# Patient Record
Sex: MALE | Race: WHITE | NOT HISPANIC OR LATINO
[De-identification: names, ages, dates, MRNs, and addresses within clinical notes are randomized per-mention and may not be internally consistent; named-entity substitution may affect disease eponyms.]

---

## 2017-05-01 ENCOUNTER — APPOINTMENT (OUTPATIENT)
Dept: UROLOGY | Facility: CLINIC | Age: 67
End: 2017-05-01

## 2017-05-01 VITALS
RESPIRATION RATE: 15 BRPM | DIASTOLIC BLOOD PRESSURE: 62 MMHG | HEART RATE: 67 BPM | BODY MASS INDEX: 26.52 KG/M2 | TEMPERATURE: 98 F | SYSTOLIC BLOOD PRESSURE: 167 MMHG | WEIGHT: 175 LBS | HEIGHT: 68 IN

## 2017-05-01 DIAGNOSIS — K60.2 ANAL FISSURE, UNSPECIFIED: ICD-10-CM

## 2017-05-01 DIAGNOSIS — Z86.39 PERSONAL HISTORY OF OTHER ENDOCRINE, NUTRITIONAL AND METABOLIC DISEASE: ICD-10-CM

## 2017-05-01 DIAGNOSIS — Z87.19 PERSONAL HISTORY OF OTHER DISEASES OF THE DIGESTIVE SYSTEM: ICD-10-CM

## 2017-05-01 DIAGNOSIS — C61 MALIGNANT NEOPLASM OF PROSTATE: ICD-10-CM

## 2017-05-01 DIAGNOSIS — Z78.9 OTHER SPECIFIED HEALTH STATUS: ICD-10-CM

## 2017-05-01 DIAGNOSIS — Z80.42 FAMILY HISTORY OF MALIGNANT NEOPLASM OF PROSTATE: ICD-10-CM

## 2017-05-01 DIAGNOSIS — Z86.79 PERSONAL HISTORY OF OTHER DISEASES OF THE CIRCULATORY SYSTEM: ICD-10-CM

## 2017-05-01 DIAGNOSIS — Z84.1 FAMILY HISTORY OF DISORDERS OF KIDNEY AND URETER: ICD-10-CM

## 2017-05-01 RX ORDER — ATORVASTATIN CALCIUM 20 MG/1
20 TABLET, FILM COATED ORAL
Refills: 0 | Status: ACTIVE | COMMUNITY

## 2017-05-01 RX ORDER — BIFIDOBACTERIUM LONGUM 10MM CELL
CAPSULE ORAL
Refills: 0 | Status: ACTIVE | COMMUNITY

## 2017-05-01 RX ORDER — CHROMIUM 200 MCG
TABLET ORAL
Refills: 0 | Status: ACTIVE | COMMUNITY

## 2017-05-01 RX ORDER — NEBIVOLOL HYDROCHLORIDE 5 MG/1
5 TABLET ORAL
Refills: 0 | Status: ACTIVE | COMMUNITY

## 2017-05-02 LAB
PSA FREE FLD-MCNC: 19 %
PSA FREE SERPL-MCNC: 0.44 NG/ML
PSA SERPL-MCNC: 2.29 NG/ML

## 2017-08-29 ENCOUNTER — APPOINTMENT (OUTPATIENT)
Dept: UROLOGY | Facility: CLINIC | Age: 67
End: 2017-08-29

## 2018-04-07 ENCOUNTER — TRANSCRIPTION ENCOUNTER (OUTPATIENT)
Age: 68
End: 2018-04-07

## 2018-12-05 ENCOUNTER — APPOINTMENT (OUTPATIENT)
Dept: INTERNAL MEDICINE | Facility: CLINIC | Age: 68
End: 2018-12-05
Payer: MEDICARE

## 2018-12-05 VITALS
HEIGHT: 68 IN | HEART RATE: 70 BPM | WEIGHT: 168 LBS | DIASTOLIC BLOOD PRESSURE: 75 MMHG | BODY MASS INDEX: 25.46 KG/M2 | SYSTOLIC BLOOD PRESSURE: 175 MMHG

## 2018-12-05 DIAGNOSIS — R68.89 OTHER GENERAL SYMPTOMS AND SIGNS: ICD-10-CM

## 2018-12-05 DIAGNOSIS — I10 ESSENTIAL (PRIMARY) HYPERTENSION: ICD-10-CM

## 2018-12-05 DIAGNOSIS — R10.13 DISEASE OF STOMACH AND DUODENUM, UNSPECIFIED: ICD-10-CM

## 2018-12-05 DIAGNOSIS — I25.10 ATHEROSCLEROTIC HEART DISEASE OF NATIVE CORONARY ARTERY W/OUT ANGINA PECTORIS: ICD-10-CM

## 2018-12-05 DIAGNOSIS — Z87.891 PERSONAL HISTORY OF NICOTINE DEPENDENCE: ICD-10-CM

## 2018-12-05 DIAGNOSIS — I70.90 UNSPECIFIED ATHEROSCLEROSIS: ICD-10-CM

## 2018-12-05 DIAGNOSIS — Z87.898 PERSONAL HISTORY OF OTHER SPECIFIED CONDITIONS: ICD-10-CM

## 2018-12-05 DIAGNOSIS — R07.9 CHEST PAIN, UNSPECIFIED: ICD-10-CM

## 2018-12-05 DIAGNOSIS — K31.9 DISEASE OF STOMACH AND DUODENUM, UNSPECIFIED: ICD-10-CM

## 2018-12-05 PROCEDURE — 99204 OFFICE O/P NEW MOD 45 MIN: CPT

## 2018-12-06 PROBLEM — I70.90 ATHEROSCLEROSIS: Status: ACTIVE | Noted: 2018-12-06

## 2018-12-06 PROBLEM — I25.10 CORONARY ARTERY ARTERIOSCLEROSIS: Status: ACTIVE | Noted: 2018-12-06

## 2018-12-06 PROBLEM — K31.9 DYSPEPSIA AND DISORDER OF FUNCTION OF STOMACH: Status: ACTIVE | Noted: 2018-12-06

## 2018-12-06 PROBLEM — R07.9 CHEST PAIN IN ADULT: Status: ACTIVE | Noted: 2018-12-06

## 2018-12-07 PROBLEM — R68.89 ABNORMAL DIGITAL RECTAL EXAM: Status: RESOLVED | Noted: 2017-05-01 | Resolved: 2018-12-07

## 2018-12-07 PROBLEM — Z87.891 FORMER SMOKER: Status: ACTIVE | Noted: 2017-05-01

## 2018-12-07 PROBLEM — Z87.898 HISTORY OF ELEVATED PROSTATE SPECIFIC ANTIGEN (PSA): Status: RESOLVED | Noted: 2017-05-01 | Resolved: 2018-12-07

## 2018-12-07 RX ORDER — DEXLANSOPRAZOLE 60 MG/1
60 CAPSULE, DELAYED RELEASE ORAL
Refills: 0 | Status: ACTIVE | COMMUNITY

## 2018-12-07 RX ORDER — TICAGRELOR 60 MG/1
60 TABLET ORAL TWICE DAILY
Refills: 0 | Status: ACTIVE | COMMUNITY

## 2018-12-07 RX ORDER — OLMESARTAN MEDOXOMIL-HYDROCHLOROTHIAZIDE 25; 40 MG/1; MG/1
40-25 TABLET, FILM COATED ORAL DAILY
Refills: 0 | Status: ACTIVE | COMMUNITY

## 2018-12-07 NOTE — PLAN
[FreeTextEntry1] : Next step:\par 1. monitor BP at home at different times of day and activity.\par 2. follow up with Cardiology soon.\par 3. Both of the above should be done independent of pursuing veganism

## 2018-12-07 NOTE — ASSESSMENT
[FreeTextEntry1] : Lengthy discussion with patient and his wife. Points can be summarized as:\par 1. There is nothing wrong with a vegan diet, whether it does or doesn't reverse his known extensive atherosclerosis. It also may or may not resolve dyspepsia. However, there is no harm in trying it\par 2. + Mult risk facotrs for atherosclerosis and a significant past hx as well. Cardiac f/u is urged. Anticipate they would do a stress test. He should worry about the results after it is done.\par 3. If there are no severe abnormalities on cardiac testing, he has the option of taking isosorbide, prn NTG, and monitoor while on his vegan diet. If there are severe changes, the timing of internvention would need to be determined by Cardiology.\par 4. ALready using a PPI and antireflux measures; any GI based sxs would be mostly functional dyspepsia.\par 5. Prostate Cancer is under control and not the cause of any sxs.

## 2018-12-07 NOTE — REVIEW OF SYSTEMS
[Patient Intake Form Reviewed] : Patient intake form was reviewed [FreeTextEntry3] : h/o retinal tear; wears glasses [FreeTextEntry8] : good urinary fx p prostatectomy [FreeTextEntry9] : sciatica

## 2018-12-07 NOTE — HISTORY OF PRESENT ILLNESS
[FreeTextEntry1] : Initial Internal Medicine evaluation at [facility] [de-identified] : 68-year-old patient troubled by occasional episodes of substernal chest pain.  The most recent episode occurred while walking, several hours after eating a vegan, bu spiced, dinner.  At other times, Cp has occurred with and without exertion, and exertion often occurs w/o pain. The problem never occurred while eating and there is never nausea, vomiting,dysphagia, or sudden severe pain radiating into the back There is past hx of dyspepsia; an endoscopy was negative for esopphagitis in 2016. 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 Much more dramatic is his hx of atherosclerosis. When GI-like sxs developd 1 year ago, the GI MD insisted on cardiac evaluation prior to an EGD. Of note, Dexilant was started and his sxs decreased but some episodes, llike the above still occurred. Cardiac evaluation revealed an abnl stress test, and on angiography 4 stents were placed in 3 vessel over 2 sessions.  He was treated for HTN, Hyperlipidemia, but has not followed up with cardiology, in part because of fear that they will want to perform further invasive and perhaps unneeded cardiac testing . One of the angiograms resulted in femoral artery injury and complications that required further treatment. In addition, there is a past hx of peripheral artery disease sxs (documentation unknown to me) and a marked family h/o a father who  at 39 from an acute cardiac event and a brother who had a CABG. He would like to follow a vegan diet in the hope of reversing atherosclerosis.

## 2018-12-07 NOTE — PHYSICAL EXAM
[No Acute Distress] : no acute distress [Well-Appearing] : well-appearing [Normal Sclera/Conjunctiva] : normal sclera/conjunctiva [No Respiratory Distress] : no respiratory distress  [Clear to Auscultation] : lungs were clear to auscultation bilaterally [Normal Rate] : normal rate  [Regular Rhythm] : with a regular rhythm [No Edema] : there was no peripheral edema

## 2019-01-30 ENCOUNTER — INPATIENT (INPATIENT)
Facility: HOSPITAL | Age: 69
LOS: 0 days | Discharge: HOME | DRG: 251 | End: 2019-01-31
Attending: INTERNAL MEDICINE | Admitting: INTERNAL MEDICINE
Payer: COMMERCIAL

## 2019-01-30 ENCOUNTER — OUTPATIENT (OUTPATIENT)
Dept: OUTPATIENT SERVICES | Facility: HOSPITAL | Age: 69
LOS: 1 days | End: 2019-01-30
Payer: MEDICARE

## 2019-01-30 VITALS
OXYGEN SATURATION: 97 % | RESPIRATION RATE: 16 BRPM | HEART RATE: 76 BPM | TEMPERATURE: 98 F | DIASTOLIC BLOOD PRESSURE: 72 MMHG | SYSTOLIC BLOOD PRESSURE: 191 MMHG

## 2019-01-30 DIAGNOSIS — I20.0 UNSTABLE ANGINA: ICD-10-CM

## 2019-01-30 DIAGNOSIS — E87.6 HYPOKALEMIA: ICD-10-CM

## 2019-01-30 DIAGNOSIS — I70.201 UNSPECIFIED ATHEROSCLEROSIS OF NATIVE ARTERIES OF EXTREMITIES, RIGHT LEG: Chronic | ICD-10-CM

## 2019-01-30 DIAGNOSIS — Z90.79 ACQUIRED ABSENCE OF OTHER GENITAL ORGAN(S): Chronic | ICD-10-CM

## 2019-01-30 DIAGNOSIS — E78.5 HYPERLIPIDEMIA, UNSPECIFIED: ICD-10-CM

## 2019-01-30 DIAGNOSIS — R07.9 CHEST PAIN, UNSPECIFIED: ICD-10-CM

## 2019-01-30 DIAGNOSIS — I25.9 CHRONIC ISCHEMIC HEART DISEASE, UNSPECIFIED: ICD-10-CM

## 2019-01-30 DIAGNOSIS — I10 ESSENTIAL (PRIMARY) HYPERTENSION: ICD-10-CM

## 2019-01-30 LAB
ALBUMIN SERPL ELPH-MCNC: 4.2 G/DL — SIGNIFICANT CHANGE UP (ref 3.3–5)
ALP SERPL-CCNC: 79 U/L — SIGNIFICANT CHANGE UP (ref 40–120)
ALT FLD-CCNC: 11 U/L — SIGNIFICANT CHANGE UP (ref 10–45)
ANION GAP SERPL CALC-SCNC: 12 MMOL/L — SIGNIFICANT CHANGE UP (ref 5–17)
APTT BLD: 31.2 SEC — SIGNIFICANT CHANGE UP (ref 27.5–36.3)
AST SERPL-CCNC: 13 U/L — SIGNIFICANT CHANGE UP (ref 10–40)
BASOPHILS NFR BLD AUTO: 0.5 % — SIGNIFICANT CHANGE UP (ref 0–2)
BILIRUB SERPL-MCNC: 0.4 MG/DL — SIGNIFICANT CHANGE UP (ref 0.2–1.2)
BUN SERPL-MCNC: 13 MG/DL — SIGNIFICANT CHANGE UP (ref 7–23)
CALCIUM SERPL-MCNC: 9.1 MG/DL — SIGNIFICANT CHANGE UP (ref 8.4–10.5)
CHLORIDE SERPL-SCNC: 102 MMOL/L — SIGNIFICANT CHANGE UP (ref 96–108)
CHOLEST SERPL-MCNC: 101 MG/DL — SIGNIFICANT CHANGE UP (ref 10–199)
CK MB CFR SERPL CALC: 1.2 NG/ML — SIGNIFICANT CHANGE UP (ref 0–6.7)
CK MB CFR SERPL CALC: 1.4 NG/ML — SIGNIFICANT CHANGE UP (ref 0–6.7)
CK SERPL-CCNC: 70 U/L — SIGNIFICANT CHANGE UP (ref 30–200)
CO2 SERPL-SCNC: 26 MMOL/L — SIGNIFICANT CHANGE UP (ref 22–31)
CREAT SERPL-MCNC: 0.89 MG/DL — SIGNIFICANT CHANGE UP (ref 0.5–1.3)
EOSINOPHIL NFR BLD AUTO: 0.8 % — SIGNIFICANT CHANGE UP (ref 0–6)
GLUCOSE SERPL-MCNC: 118 MG/DL — HIGH (ref 70–99)
HBA1C BLD-MCNC: 5.5 % — SIGNIFICANT CHANGE UP (ref 4–5.6)
HCT VFR BLD CALC: 37.1 % — LOW (ref 39–50)
HDLC SERPL-MCNC: 42 MG/DL — SIGNIFICANT CHANGE UP
HGB BLD-MCNC: 12.8 G/DL — LOW (ref 13–17)
INR BLD: 1.2 — HIGH (ref 0.88–1.16)
LIPID PNL WITH DIRECT LDL SERPL: 36 MG/DL — SIGNIFICANT CHANGE UP
LYMPHOCYTES # BLD AUTO: 12.1 % — LOW (ref 13–44)
MAGNESIUM SERPL-MCNC: 2 MG/DL — SIGNIFICANT CHANGE UP (ref 1.6–2.6)
MCHC RBC-ENTMCNC: 29.7 PG — SIGNIFICANT CHANGE UP (ref 27–34)
MCHC RBC-ENTMCNC: 34.5 G/DL — SIGNIFICANT CHANGE UP (ref 32–36)
MCV RBC AUTO: 86.1 FL — SIGNIFICANT CHANGE UP (ref 80–100)
MONOCYTES NFR BLD AUTO: 11.9 % — SIGNIFICANT CHANGE UP (ref 2–14)
NEUTROPHILS NFR BLD AUTO: 74.7 % — SIGNIFICANT CHANGE UP (ref 43–77)
PLATELET # BLD AUTO: 277 K/UL — SIGNIFICANT CHANGE UP (ref 150–400)
POTASSIUM SERPL-MCNC: 3.3 MMOL/L — LOW (ref 3.5–5.3)
POTASSIUM SERPL-SCNC: 3.3 MMOL/L — LOW (ref 3.5–5.3)
PROT SERPL-MCNC: 7 G/DL — SIGNIFICANT CHANGE UP (ref 6–8.3)
PROTHROM AB SERPL-ACNC: 13.6 SEC — HIGH (ref 10–12.9)
RBC # BLD: 4.31 M/UL — SIGNIFICANT CHANGE UP (ref 4.2–5.8)
RBC # FLD: 12.5 % — SIGNIFICANT CHANGE UP (ref 10.3–16.9)
SODIUM SERPL-SCNC: 140 MMOL/L — SIGNIFICANT CHANGE UP (ref 135–145)
TOTAL CHOLESTEROL/HDL RATIO MEASUREMENT: 2.4 RATIO — LOW (ref 3.4–9.6)
TRIGL SERPL-MCNC: 113 MG/DL — SIGNIFICANT CHANGE UP (ref 10–149)
TROPONIN T SERPL-MCNC: <0.01 NG/ML — SIGNIFICANT CHANGE UP (ref 0–0.01)
TROPONIN T SERPL-MCNC: <0.01 NG/ML — SIGNIFICANT CHANGE UP (ref 0–0.01)
TSH SERPL-MCNC: 0.89 UIU/ML — SIGNIFICANT CHANGE UP (ref 0.35–4.94)
WBC # BLD: 8.6 K/UL — SIGNIFICANT CHANGE UP (ref 3.8–10.5)
WBC # FLD AUTO: 8.6 K/UL — SIGNIFICANT CHANGE UP (ref 3.8–10.5)

## 2019-01-30 PROCEDURE — 99285 EMERGENCY DEPT VISIT HI MDM: CPT

## 2019-01-30 PROCEDURE — 99222 1ST HOSP IP/OBS MODERATE 55: CPT | Mod: AI

## 2019-01-30 PROCEDURE — 93018 CV STRESS TEST I&R ONLY: CPT

## 2019-01-30 PROCEDURE — 93016 CV STRESS TEST SUPVJ ONLY: CPT

## 2019-01-30 PROCEDURE — 78452 HT MUSCLE IMAGE SPECT MULT: CPT | Mod: 26

## 2019-01-30 RX ORDER — LOSARTAN POTASSIUM 100 MG/1
100 TABLET, FILM COATED ORAL DAILY
Qty: 0 | Refills: 0 | Status: DISCONTINUED | OUTPATIENT
Start: 2019-01-30 | End: 2019-01-31

## 2019-01-30 RX ORDER — ATORVASTATIN CALCIUM 80 MG/1
80 TABLET, FILM COATED ORAL AT BEDTIME
Qty: 0 | Refills: 0 | Status: DISCONTINUED | OUTPATIENT
Start: 2019-01-30 | End: 2019-01-31

## 2019-01-30 RX ORDER — ASPIRIN/CALCIUM CARB/MAGNESIUM 324 MG
81 TABLET ORAL DAILY
Qty: 0 | Refills: 0 | Status: DISCONTINUED | OUTPATIENT
Start: 2019-01-30 | End: 2019-01-31

## 2019-01-30 RX ORDER — PANTOPRAZOLE SODIUM 20 MG/1
40 TABLET, DELAYED RELEASE ORAL AT BEDTIME
Qty: 0 | Refills: 0 | Status: DISCONTINUED | OUTPATIENT
Start: 2019-01-30 | End: 2019-01-30

## 2019-01-30 RX ORDER — NEBIVOLOL HYDROCHLORIDE 5 MG/1
5 TABLET ORAL
Qty: 0 | Refills: 0 | Status: DISCONTINUED | OUTPATIENT
Start: 2019-01-30 | End: 2019-01-31

## 2019-01-30 RX ORDER — NEBIVOLOL HYDROCHLORIDE 5 MG/1
5 TABLET ORAL DAILY
Qty: 0 | Refills: 0 | Status: DISCONTINUED | OUTPATIENT
Start: 2019-01-30 | End: 2019-01-30

## 2019-01-30 RX ORDER — HYDROCHLOROTHIAZIDE 25 MG
12.5 TABLET ORAL DAILY
Qty: 0 | Refills: 0 | Status: DISCONTINUED | OUTPATIENT
Start: 2019-01-30 | End: 2019-01-31

## 2019-01-30 RX ORDER — LANOLIN ALCOHOL/MO/W.PET/CERES
3 CREAM (GRAM) TOPICAL AT BEDTIME
Qty: 0 | Refills: 0 | Status: DISCONTINUED | OUTPATIENT
Start: 2019-01-30 | End: 2019-01-31

## 2019-01-30 RX ORDER — PANTOPRAZOLE SODIUM 20 MG/1
40 TABLET, DELAYED RELEASE ORAL
Qty: 0 | Refills: 0 | Status: DISCONTINUED | OUTPATIENT
Start: 2019-01-30 | End: 2019-01-31

## 2019-01-30 RX ORDER — POTASSIUM CHLORIDE 20 MEQ
60 PACKET (EA) ORAL ONCE
Qty: 0 | Refills: 0 | Status: COMPLETED | OUTPATIENT
Start: 2019-01-30 | End: 2019-01-30

## 2019-01-30 RX ORDER — TICAGRELOR 90 MG/1
90 TABLET ORAL
Qty: 0 | Refills: 0 | Status: DISCONTINUED | OUTPATIENT
Start: 2019-01-30 | End: 2019-01-31

## 2019-01-30 RX ADMIN — NEBIVOLOL HYDROCHLORIDE 5 MILLIGRAM(S): 5 TABLET ORAL at 22:29

## 2019-01-30 RX ADMIN — ATORVASTATIN CALCIUM 80 MILLIGRAM(S): 80 TABLET, FILM COATED ORAL at 22:10

## 2019-01-30 RX ADMIN — TICAGRELOR 90 MILLIGRAM(S): 90 TABLET ORAL at 22:09

## 2019-01-30 RX ADMIN — PANTOPRAZOLE SODIUM 40 MILLIGRAM(S): 20 TABLET, DELAYED RELEASE ORAL at 22:29

## 2019-01-30 RX ADMIN — Medication 60 MILLIEQUIVALENT(S): at 22:09

## 2019-01-30 NOTE — H&P ADULT - ASSESSMENT
69 yo male, former smoker, FHX CAD (brother has 4VCABG), PMHx HTN, Hyperlipidemia, GERD, h/o prostate CA s/p radical prostatectomy (2016), CAD s/p HOLLY x 2 LAD, LCX, RCA in 2018 at [facility] complicated by right femoral artery damage tx with femoral endarterectomy presented to cardiologist, Dr Joey Tellez, endorsing intermittent chest pressure with minimal exertion, worse when it is cold outside, worsening over the last 2 months. Denies SOB, dizziness, palpitations, nausea, vomiting, PND/orthopnea, LE edema, claudication. Pt had NST in Dr Tellez's office on 1/30/19 which was concerning for LAD disease and sent the pt to [facility] ED. In [facility] ED pt asymptomatic at rest. VSS. EKG NSR at 75 bpm with TWI AVL. Trop neg x 1. Pt admitted to [facility] further management of unstable angina. Pt precathed and consented for cardiac cath with Dr Deleon on 1/31/19 AM. 67 yo male, former smoker, FHX CAD (brother has 4VCABG), PMHx HTN, Hyperlipidemia, GERD, h/o prostate CA s/p radical prostatectomy (2016), CAD s/p HOLLY x 2 LAD, LCX, RCA in 2018 at [facility] complicated by right femoral artery damage tx with femoral endarterectomy presented to cardiologist, Dr Joey Tellez, endorsing intermittent chest pressure with minimal exertion, worse when it is cold outside, worsening over the last 2 months. Denies SOB, dizziness, palpitations, nausea, vomiting, PND/orthopnea, LE edema, claudication. NST on 1/30/19 in Dr Tellez's office showed  2-3mm horizontal ST depressions II, II, AVF and V4-V6 with substernal chest pain (5/10 in intensity). Also pt had medium-sized, moderate intensity, reversible perfusion defect in anterior wall from base to apex in distribution of prox LAD. In [facility] ED pt asymptomatic at rest. VSS. EKG NSR at 75 bpm with TWI AVL. Trop neg x 1. Pt admitted to [facility] further management of unstable angina. Pt precathed and consented for cardiac cath with Dr Deleon on 1/31/19 AM.

## 2019-01-30 NOTE — H&P ADULT - PROBLEM SELECTOR PLAN 1
- currently asymptomatic  - EKG NSR 75bpm with no TWI AVL. Trop neg x 1, f/u 10pm and AM   - NST 1/30/19  2-3mm horizontal ST depressions II, II, AVF and V4-V6 with substernal chest pain (5/10 in intensity). Also pt had medium-sized, moderate intensity, reversible perfusion defect in anterior wall from base to apex in distribution of prox LAD.   - Pt was increased from Brilinta 60mg BID to Brilinta 90mg BID. Continue Aspirin 81mg daily. Pt was recently increased from Atorvastatin 20mg daily to Rosuvastatin 20mg daily one week ago by Dr Tellez. Continue Atorvastatin 80mg daily at bedtime while inpatient.   - Pt precathed/consented for cardiac cath on 1/31/19 with Dr Deleon.  - Echo ordered.  - NPO after midnight for cath in AM

## 2019-01-30 NOTE — ED ADULT TRIAGE NOTE - OTHER COMPLAINTS
pt admits to having cp during stress test today. denies cp or sob at this time. hx stentx4. ekg in progress.

## 2019-01-30 NOTE — H&P ADULT - PROBLEM SELECTOR PLAN 2
- Continuing Bystolic 5mg daily, Hydrochlorothiazide 12.5mg daily, and switching home Olmesartan 40mg daily to Losartan 100mg daily inpatient.

## 2019-01-30 NOTE — ED ADULT NURSE NOTE - OBJECTIVE STATEMENT
Pt presents with CP during stress test today, resolved with rest. Pt states he has been having CP intermittently x few months whenever he exercises. Pt denies any SOB, palpitations, dizziness. Pt reports history of 4 stents.

## 2019-01-30 NOTE — H&P ADULT - ATTENDING COMMENTS
Assessment: Patient personally seen and examined myself during rounds with the Physician Assistant/House Staff/Nurse Practitioner   ON DATE 1/30/19  Physician Assistant/House Staff/Nurse Practitioner note read, including vitals, physical findings, laboratory data, and radiological reports.   Revisions included below.   Direct personal management at bed side and extensive interpretation of the data.    Plan was outlined and discussed in details with the Physician Assistant/House Staff/Nurse Practitioner.    Decision making of high complexity   Risk high of complications, morbidity, and/or mortality  Assessment and Action taken for acute disease activity to reflect the level of care provided:  -Hemodynamic evaluation and support  -ACS assessment and treatment as applicable  -Heart failure assessment and treatment as applicable  -Cardiac Telemetry reviewed  -Medication reconciliation  -Review laboratory data  -EKG reviewed - no stemi  -Echo ordered  -Interdisciplinary discussion with IC / EP / HF / CTS teams as needed  TIME SPENT in evaluation and management, reassessments, review and interpretation of labs and x-rays, and hemodynamic management, formulating a plan and coordinating care: ___70____ MIN.  Time does not include procedural time.    Jose Varela MD  Cardiology    Mobile: [phone]  Office: [phone]  [address] [place], [place] [address]

## 2019-01-30 NOTE — ED ADULT NURSE NOTE - NSIMPLEMENTINTERV_GEN_ALL_ED
Implemented All Universal Safety Interventions:  [place] to call system. Call bell, personal items and telephone within reach. Instruct patient to call for assistance. Room bathroom lighting operational. Non-slip footwear when patient is off stretcher. Physically safe environment: no spills, clutter or unnecessary equipment. Stretcher in lowest position, wheels locked, appropriate side rails in place.

## 2019-01-30 NOTE — H&P ADULT - HISTORY OF PRESENT ILLNESS
67 yo male, former smoker, FHX CAD (brother has 4VCABG), PMHx HTN, Hyperlipidemia, GERD, h/o prostate CA s/p radical prostatectomy (2016), CAD s/p HOLLY x 2 LAD, LCX, RCA in 2018 at [facility] complicated by right femoral artery damage tx with femoral endarterectomy presented to cardiologist, Dr Joey Tellez, endorsing intermittent chest pressure with minimal exertion, worse when it is cold outside, worsening over the last 2 months. Denies SOB, dizziness, palpitations, nausea, vomiting, PND/orthopnea, LE edema, claudication. Pt had NST in Dr Tellez's office on 1/30/19 which was concerning for LAD disease and sent the pt to [facility] ED. In [facility] ED pt asymptomatic at rest. VSS. EKG NSR at 75 bpm with TWI AVL. Trop neg x 1. Pt admitted to [facility] further management of unstable angina. Pt precathed and consented for cardiac cath with Dr Deleon on 1/31/19 AM. 67 yo male, former smoker, FHX CAD (brother has 4VCABG), PMHx HTN, Hyperlipidemia, GERD, h/o prostate CA s/p radical prostatectomy (2016), CAD s/p HOLLY x 2 LAD, LCX, RCA in 2018 at [facility] complicated by right femoral artery damage tx with femoral endarterectomy presented to cardiologist, Dr Joey Tellez, endorsing intermittent chest pressure with minimal exertion, worse when it is cold outside, worsening over the last 2 months. Denies SOB, dizziness, palpitations, nausea, vomiting, PND/orthopnea, LE edema, claudication. NST on 1/30/19 in Dr Tellez's office showed  2-3mm horizontal ST depressions II, II, AVF and V4-V6 with substernal chest pain (5/10 in intensity). Also pt had medium-sized, moderate intensity, reversible perfusion defect in anterior wall from base to apex in distribution of prox LAD. In [facility] ED pt asymptomatic at rest. VSS. EKG NSR at 75 bpm with TWI AVL. Trop neg x 1. Pt admitted to [facility] further management of unstable angina. Pt precathed and consented for cardiac cath with Dr Deleon on 1/31/19 AM.

## 2019-01-30 NOTE — ED ADULT NURSE NOTE - CHPI ED NUR SYMPTOMS NEG
no back pain/no congestion/no dizziness/no diaphoresis/no nausea/no syncope/no fever/no chills/no shortness of breath/no vomiting

## 2019-01-30 NOTE — H&P ADULT - NEGATIVE CARDIOVASCULAR SYMPTOMS
no orthopnea/no paroxysmal nocturnal dyspnea/no palpitations/no peripheral edema/no claudication/no dyspnea on exertion

## 2019-01-30 NOTE — ED PROVIDER NOTE - OBJECTIVE STATEMENT
67 y/o m with PMH of CAD s/p 4 stents one year prior, GERD, HLD, radical prostatectomy 2017 presents to ED with cards fellow for abnormal outpt stress test showing possible LAD abnormality.  Pt sent for stress test after seeing Dr. Castillo for first time one week ago.  Pt states he intermittently has chest pain from time to time but is able to exercise and usually has no symptoms.  No current chest pain or shortness of breath in ED.  Feels well without complaints.

## 2019-01-30 NOTE — H&P ADULT - PROBLEM SELECTOR PLAN 3
Pt was recently increased from Atorvastatin 20mg daily to Rosuvastatin 20mg daily one week ago by Dr Tellez. Continue Atorvastatin 80mg daily at bedtime while inpatient.

## 2019-01-30 NOTE — ED PROVIDER NOTE - MEDICAL DECISION MAKING DETAILS
Pt with abnormal stress test sent to ED for admission for cath tomorrow.  Cp free in ED.  No ischemic changes on EKG.  Labs done in ED.  Will admit to tele for further management.  Discussed with cards fellow who is aware of admission to service attending Dr. Varela.

## 2019-01-31 ENCOUNTER — TRANSCRIPTION ENCOUNTER (OUTPATIENT)
Age: 69
End: 2019-01-31

## 2019-01-31 VITALS
OXYGEN SATURATION: 99 % | SYSTOLIC BLOOD PRESSURE: 133 MMHG | DIASTOLIC BLOOD PRESSURE: 67 MMHG | RESPIRATION RATE: 16 BRPM | HEART RATE: 58 BPM

## 2019-01-31 LAB
ALBUMIN SERPL ELPH-MCNC: 4.2 G/DL — SIGNIFICANT CHANGE UP (ref 3.3–5)
ALP SERPL-CCNC: 82 U/L — SIGNIFICANT CHANGE UP (ref 40–120)
ALT FLD-CCNC: 11 U/L — SIGNIFICANT CHANGE UP (ref 10–45)
ANION GAP SERPL CALC-SCNC: 13 MMOL/L — SIGNIFICANT CHANGE UP (ref 5–17)
ANION GAP SERPL CALC-SCNC: 8 MMOL/L — SIGNIFICANT CHANGE UP (ref 5–17)
APTT BLD: 32 SEC — SIGNIFICANT CHANGE UP (ref 27.5–36.3)
AST SERPL-CCNC: 13 U/L — SIGNIFICANT CHANGE UP (ref 10–40)
BILIRUB SERPL-MCNC: 0.5 MG/DL — SIGNIFICANT CHANGE UP (ref 0.2–1.2)
BUN SERPL-MCNC: 14 MG/DL — SIGNIFICANT CHANGE UP (ref 7–23)
BUN SERPL-MCNC: 16 MG/DL — SIGNIFICANT CHANGE UP (ref 7–23)
CALCIUM SERPL-MCNC: 9.5 MG/DL — SIGNIFICANT CHANGE UP (ref 8.4–10.5)
CALCIUM SERPL-MCNC: 9.5 MG/DL — SIGNIFICANT CHANGE UP (ref 8.4–10.5)
CHLORIDE SERPL-SCNC: 106 MMOL/L — SIGNIFICANT CHANGE UP (ref 96–108)
CHLORIDE SERPL-SCNC: 110 MMOL/L — HIGH (ref 96–108)
CO2 SERPL-SCNC: 27 MMOL/L — SIGNIFICANT CHANGE UP (ref 22–31)
CO2 SERPL-SCNC: 27 MMOL/L — SIGNIFICANT CHANGE UP (ref 22–31)
CREAT SERPL-MCNC: 0.91 MG/DL — SIGNIFICANT CHANGE UP (ref 0.5–1.3)
CREAT SERPL-MCNC: 0.93 MG/DL — SIGNIFICANT CHANGE UP (ref 0.5–1.3)
GLUCOSE SERPL-MCNC: 106 MG/DL — HIGH (ref 70–99)
GLUCOSE SERPL-MCNC: 115 MG/DL — HIGH (ref 70–99)
HCT VFR BLD CALC: 37.8 % — LOW (ref 39–50)
HCT VFR BLD CALC: 40.2 % — SIGNIFICANT CHANGE UP (ref 39–50)
HGB BLD-MCNC: 12.7 G/DL — LOW (ref 13–17)
HGB BLD-MCNC: 13.6 G/DL — SIGNIFICANT CHANGE UP (ref 13–17)
INR BLD: 1.14 — SIGNIFICANT CHANGE UP (ref 0.88–1.16)
MAGNESIUM SERPL-MCNC: 2.2 MG/DL — SIGNIFICANT CHANGE UP (ref 1.6–2.6)
MAGNESIUM SERPL-MCNC: 2.3 MG/DL — SIGNIFICANT CHANGE UP (ref 1.6–2.6)
MCHC RBC-ENTMCNC: 29.5 PG — SIGNIFICANT CHANGE UP (ref 27–34)
MCHC RBC-ENTMCNC: 29.5 PG — SIGNIFICANT CHANGE UP (ref 27–34)
MCHC RBC-ENTMCNC: 33.6 G/DL — SIGNIFICANT CHANGE UP (ref 32–36)
MCHC RBC-ENTMCNC: 33.8 G/DL — SIGNIFICANT CHANGE UP (ref 32–36)
MCV RBC AUTO: 87.2 FL — SIGNIFICANT CHANGE UP (ref 80–100)
MCV RBC AUTO: 87.7 FL — SIGNIFICANT CHANGE UP (ref 80–100)
PLATELET # BLD AUTO: 275 K/UL — SIGNIFICANT CHANGE UP (ref 150–400)
PLATELET # BLD AUTO: 303 K/UL — SIGNIFICANT CHANGE UP (ref 150–400)
POTASSIUM SERPL-MCNC: 3.7 MMOL/L — SIGNIFICANT CHANGE UP (ref 3.5–5.3)
POTASSIUM SERPL-MCNC: 4.3 MMOL/L — SIGNIFICANT CHANGE UP (ref 3.5–5.3)
POTASSIUM SERPL-SCNC: 3.7 MMOL/L — SIGNIFICANT CHANGE UP (ref 3.5–5.3)
POTASSIUM SERPL-SCNC: 4.3 MMOL/L — SIGNIFICANT CHANGE UP (ref 3.5–5.3)
PROT SERPL-MCNC: 6.5 G/DL — SIGNIFICANT CHANGE UP (ref 6–8.3)
PROTHROM AB SERPL-ACNC: 12.9 SEC — SIGNIFICANT CHANGE UP (ref 10–12.9)
RBC # BLD: 4.31 M/UL — SIGNIFICANT CHANGE UP (ref 4.2–5.8)
RBC # BLD: 4.61 M/UL — SIGNIFICANT CHANGE UP (ref 4.2–5.8)
RBC # FLD: 12.8 % — SIGNIFICANT CHANGE UP (ref 10.3–16.9)
RBC # FLD: 12.9 % — SIGNIFICANT CHANGE UP (ref 10.3–16.9)
SODIUM SERPL-SCNC: 145 MMOL/L — SIGNIFICANT CHANGE UP (ref 135–145)
SODIUM SERPL-SCNC: 146 MMOL/L — HIGH (ref 135–145)
WBC # BLD: 7.4 K/UL — SIGNIFICANT CHANGE UP (ref 3.8–10.5)
WBC # BLD: 7.9 K/UL — SIGNIFICANT CHANGE UP (ref 3.8–10.5)
WBC # FLD AUTO: 7.4 K/UL — SIGNIFICANT CHANGE UP (ref 3.8–10.5)
WBC # FLD AUTO: 7.9 K/UL — SIGNIFICANT CHANGE UP (ref 3.8–10.5)

## 2019-01-31 PROCEDURE — 92920 PRQ TRLUML C ANGIOP 1ART&/BR: CPT | Mod: LD

## 2019-01-31 PROCEDURE — 99238 HOSP IP/OBS DSCHRG MGMT 30/<: CPT

## 2019-01-31 PROCEDURE — 93306 TTE W/DOPPLER COMPLETE: CPT | Mod: 26

## 2019-01-31 PROCEDURE — 93458 L HRT ARTERY/VENTRICLE ANGIO: CPT | Mod: 26,XU

## 2019-01-31 RX ORDER — TICAGRELOR 90 MG/1
1 TABLET ORAL
Qty: 180 | Refills: 3
Start: 2019-01-31 | End: 2020-01-25

## 2019-01-31 RX ORDER — SODIUM CHLORIDE 9 MG/ML
1000 INJECTION INTRAMUSCULAR; INTRAVENOUS; SUBCUTANEOUS
Qty: 0 | Refills: 0 | Status: DISCONTINUED | OUTPATIENT
Start: 2019-01-31 | End: 2019-01-31

## 2019-01-31 RX ORDER — SODIUM CHLORIDE 9 MG/ML
500 INJECTION INTRAMUSCULAR; INTRAVENOUS; SUBCUTANEOUS
Qty: 0 | Refills: 0 | Status: DISCONTINUED | OUTPATIENT
Start: 2019-01-31 | End: 2019-01-31

## 2019-01-31 RX ORDER — TICAGRELOR 90 MG/1
1 TABLET ORAL
Qty: 0 | Refills: 0 | COMMUNITY

## 2019-01-31 RX ADMIN — Medication 12.5 MILLIGRAM(S): at 06:39

## 2019-01-31 RX ADMIN — LOSARTAN POTASSIUM 100 MILLIGRAM(S): 100 TABLET, FILM COATED ORAL at 06:38

## 2019-01-31 RX ADMIN — TICAGRELOR 90 MILLIGRAM(S): 90 TABLET ORAL at 06:39

## 2019-01-31 RX ADMIN — Medication 81 MILLIGRAM(S): at 12:46

## 2019-01-31 NOTE — DISCHARGE NOTE ADULT - CARE PROVIDER_API CALL
Joey Tellez (MD)  Cardiovascular Medicine  [address], [address]  [place] [address]  Phone: [phone]  Fax: [phone]

## 2019-01-31 NOTE — DISCHARGE NOTE ADULT - MEDICATION SUMMARY - MEDICATIONS TO STOP TAKING
I will STOP taking the medications listed below when I get home from the hospital:    Brilinta (ticagrelor) 60 mg oral tablet  -- 1 tab(s) by mouth 2 times a day

## 2019-01-31 NOTE — DISCHARGE NOTE ADULT - CARE PLAN
Principal Discharge DX:	CAD (coronary artery disease)  Goal:	Please continue aspirin 81 mg daily and Brilinta 90 mg twice daily  Assessment and plan of treatment:	You underwent a cardiac catheterization and received a balloon to open up your previously placed stent in the left anterior descending artery. The procedure was done through the right wrist.  You can removed the bandage in 24 hours and then You do not need to keep this area covered.  You may shower after removing the bandage, no soaking in a bathtub, swimming or hot tub for 5 days.  Please avoid any heavy lifting  (no more than 3 to 5 lbs) or strenuous activity for five days.  If you develop any swelling, bleeding, hardening of the skin (hematoma formation), acute pain, numbness/tingling  in your arm please contact your doctor immediately or call our 24/7 line: [phone]    NEVER MISS A DOSE OF ASPIRIN OR BRILINTA IF YOU DO, YOU ARE AT RISK OF YOUR STENT CLOSING AND HAVING A HEART ATTACK. DO NOT STOP THESE TWO MEDICATIONS UNLESS INSTRUCTED TO DO SO BY YOUR CARDIOLOGIST    Please follow up with Dr. Tellez on Wednesday 2/6/19  Secondary Diagnosis:	Hyperlipidemia  Goal:	Please continue crestor 20 mg daily  Secondary Diagnosis:	Hypertension  Goal:	Please continue bystolic 5 mg daily and benicar 40-12.5 mg daily

## 2019-01-31 NOTE — DISCHARGE NOTE ADULT - HOSPITAL COURSE
67 yo male, former smoker, FHX CAD (brother has 4VCABG), PMHx HTN, Hyperlipidemia, GERD, h/o prostate CA s/p radical prostatectomy (2016), CAD s/p HOLLY x 2 LAD, LCX, RCA in 2018 at [facility] complicated by right femoral artery damage tx with femoral endarterectomy presented to cardiologist, Dr Joey Tellez, endorsing intermittent chest pressure with minimal exertion, worse when it is cold outside, worsening over the last 2 months. Denies SOB, dizziness, palpitations, nausea, vomiting, PND/orthopnea, LE edema, claudication. NST on 1/30/19 in Dr Tellez's office showed  2-3mm horizontal ST depressions II, II, AVF and V4-V6 with substernal chest pain (5/10 in intensity). Also pt had medium-sized, moderate intensity, reversible perfusion defect in anterior wall from base to apex in distribution of prox LAD. In [facility] ED pt asymptomatic at rest. VSS. EKG NSR at 75 bpm with TWI AVL. Trop neg x 1. Pt admitted to [facility] further management of unstable angina. Pt precathed and consented for cardiac cath with Dr Deleon on 1/31/19 AM. Pt s/p cardiac cath showing Single vessel CAD (90% ISR mLAD), s/p angiosculpt and PTCA of mLAD, Patent stents in LAD, LCX & RCA,  EF 65%, LVEDP 21. R radial access site. Pt will continue with asa 81 mg daily, brilinta 90 mg BID, crestor 20 mg, bystolic 5 mg, and benicar 40-12.5mg. Radial site stable, no hematoma, bleeding, distal pulse intact. ECHO prior to discharge showing....   As discussed with Dr. Deleon and Dr. Varela pt is stable for discharge post cath after 3PM and will follow up with Dr. Rankin on Wed 2/6/19. 69 yo male, former smoker, FHX CAD (brother has 4VCABG), PMHx HTN, Hyperlipidemia, GERD, h/o prostate CA s/p radical prostatectomy (2016), CAD s/p HOLLY x 2 LAD, LCX, RCA in 2018 at [facility] complicated by right femoral artery damage tx with femoral endarterectomy presented to cardiologist, Dr Joey Tellez, endorsing intermittent chest pressure with minimal exertion, worse when it is cold outside, worsening over the last 2 months. Denies SOB, dizziness, palpitations, nausea, vomiting, PND/orthopnea, LE edema, claudication. NST on 1/30/19 in Dr Tellez's office showed  2-3mm horizontal ST depressions II, II, AVF and V4-V6 with substernal chest pain (5/10 in intensity). Also pt had medium-sized, moderate intensity, reversible perfusion defect in anterior wall from base to apex in distribution of prox LAD. In [facility] ED pt asymptomatic at rest. VSS. EKG NSR at 75 bpm with TWI AVL. Trop neg x 1. Pt admitted to [facility] further management of unstable angina. Pt precathed and consented for cardiac cath with Dr Deleon on 1/31/19 AM. Pt s/p cardiac cath showing Single vessel CAD (90% ISR mLAD), s/p angiosculpt and PTCA of mLAD, Patent stents in LAD, LCX & RCA,  EF 65%, LVEDP 21. R radial access site. Pt will continue with asa 81 mg daily, brilinta 90 mg BID, crestor 20 mg, bystolic 5 mg, and benicar 40-12.5mg. Radial site stable, no hematoma, bleeding, distal pulse intact. ECHO prior to discharge showing mild LVH, normal wall motion, EF 62%, no significant valvular disease, no pericardial effusion. Post cath labs and EKG stable prior to discharge.  As discussed with Dr. Deleon and Dr. Varela pt is stable for discharge post cath after 3PM and will follow up with Dr. Rankin on Wed 2/6/19.

## 2019-01-31 NOTE — DISCHARGE NOTE ADULT - PATIENT PORTAL LINK FT
You can access the [facility] Patient Portal, offered by [facility], by registering with the following website: [email or web address]

## 2019-01-31 NOTE — PROGRESS NOTE ADULT - SUBJECTIVE AND OBJECTIVE BOX
Procedure: [facility], PTCA of mLAD, Radial band  Indication: UA, CAD  Complication: none    Result:  1) Single vessel CAD (90% ISR mLAD)  2) Patent stents in LAD, LCX & RCA  3) Normal LV function, EF 65%, LVEDP 21  4) Successful Angiosculpt PTCA of mLAD      Plan: Cont. Brilinta 90mg (not 60mg) BID + ASA 81mg QD x 12 months. D/C home this afternoon @3PM - to f/u with Dr. Tellez.

## 2019-01-31 NOTE — DISCHARGE NOTE ADULT - MEDICATION SUMMARY - MEDICATIONS TO TAKE
I will START or STAY ON the medications listed below when I get home from the hospital:    aspirin 81 mg oral tablet, chewable  -- 1 tab(s) by mouth once a day  -- Indication: For CAD (coronary artery disease), keeps stents open    Crestor 20 mg oral tablet  -- 1 tab(s) by mouth once a day (at bedtime)  -- Indication: For Cholesterol    Benicar HCT 40 mg-12.5 mg oral tablet  -- 1 tab(s) by mouth once a day  -- Indication: For blood pressure    ticagrelor 90 mg oral tablet  -- 1 tab(s) by mouth 2 times a day  -- Indication: For CAD (coronary artery disease), keeps stents open     Bystolic 5 mg oral tablet  -- 1 tab(s) by mouth once a day  -- Indication: For blood pressure     Dexilant 60 mg oral delayed release capsule  -- 1 cap(s) by mouth once a day  -- Indication: For Acid reflux

## 2019-01-31 NOTE — DISCHARGE NOTE ADULT - PLAN OF CARE
Please continue aspirin 81 mg daily and Brilinta 90 mg twice daily You underwent a cardiac catheterization and received a balloon to open up your previously placed stent in the left anterior descending artery. The procedure was done through the right wrist.  You can removed the bandage in 24 hours and then You do not need to keep this area covered.  You may shower after removing the bandage, no soaking in a bathtub, swimming or hot tub for 5 days.  Please avoid any heavy lifting  (no more than 3 to 5 lbs) or strenuous activity for five days.  If you develop any swelling, bleeding, hardening of the skin (hematoma formation), acute pain, numbness/tingling  in your arm please contact your doctor immediately or call our 24/7 line: [phone]    NEVER MISS A DOSE OF ASPIRIN OR BRILINTA IF YOU DO, YOU ARE AT RISK OF YOUR STENT CLOSING AND HAVING A HEART ATTACK. DO NOT STOP THESE TWO MEDICATIONS UNLESS INSTRUCTED TO DO SO BY YOUR CARDIOLOGIST    Please follow up with Dr. Tellez on Wednesday 2/6/19 Please continue crestor 20 mg daily Please continue bystolic 5 mg daily and benicar 40-12.5 mg daily

## 2019-02-07 DIAGNOSIS — Z79.82 LONG TERM (CURRENT) USE OF ASPIRIN: ICD-10-CM

## 2019-02-07 DIAGNOSIS — I10 ESSENTIAL (PRIMARY) HYPERTENSION: ICD-10-CM

## 2019-02-07 DIAGNOSIS — E78.5 HYPERLIPIDEMIA, UNSPECIFIED: ICD-10-CM
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
## 2019-02-07 DIAGNOSIS — Z85.46 PERSONAL HISTORY OF MALIGNANT NEOPLASM OF PROSTATE: ICD-10-CM

## 2019-02-07 DIAGNOSIS — K21.9 GASTRO-ESOPHAGEAL REFLUX DISEASE WITHOUT ESOPHAGITIS: ICD-10-CM

## 2019-02-07 DIAGNOSIS — Z87.891 PERSONAL HISTORY OF NICOTINE DEPENDENCE: ICD-10-CM

## 2019-02-07 DIAGNOSIS — E87.6 HYPOKALEMIA: ICD-10-CM

## 2019-02-07 DIAGNOSIS — Z95.5 PRESENCE OF CORONARY ANGIOPLASTY IMPLANT AND GRAFT: ICD-10-CM

## 2019-02-07 DIAGNOSIS — I25.110 ATHEROSCLEROTIC HEART DISEASE OF NATIVE CORONARY ARTERY WITH UNSTABLE ANGINA PECTORIS: ICD-10-CM

## 2019-02-07 DIAGNOSIS — T82.855A STENOSIS OF CORONARY ARTERY STENT, INITIAL ENCOUNTER: ICD-10-CM

## 2019-02-07 DIAGNOSIS — Z90.79 ACQUIRED ABSENCE OF OTHER GENITAL ORGAN(S): ICD-10-CM

## 2019-02-07 DIAGNOSIS — Z82.49 FAMILY HISTORY OF ISCHEMIC HEART DISEASE AND OTHER DISEASES OF THE CIRCULATORY SYSTEM: ICD-10-CM

## 2019-02-07 PROBLEM — C61 MALIGNANT NEOPLASM OF PROSTATE: Chronic | Status: ACTIVE | Noted: 2019-01-30

## 2019-02-07 PROBLEM — I25.10 ATHEROSCLEROTIC HEART DISEASE OF NATIVE CORONARY ARTERY WITHOUT ANGINA PECTORIS: Chronic | Status: ACTIVE | Noted: 2019-01-30

## 2019-02-15 PROCEDURE — 99285 EMERGENCY DEPT VISIT HI MDM: CPT

## 2019-02-15 PROCEDURE — 80053 COMPREHEN METABOLIC PANEL: CPT

## 2019-02-15 PROCEDURE — 85730 THROMBOPLASTIN TIME PARTIAL: CPT

## 2019-02-15 PROCEDURE — 82550 ASSAY OF CK (CPK): CPT

## 2019-02-15 PROCEDURE — 80061 LIPID PANEL: CPT

## 2019-02-15 PROCEDURE — 92920 PRQ TRLUML C ANGIOP 1ART&/BR: CPT

## 2019-02-15 PROCEDURE — 78452 HT MUSCLE IMAGE SPECT MULT: CPT

## 2019-02-15 PROCEDURE — C1887: CPT

## 2019-02-15 PROCEDURE — 82553 CREATINE MB FRACTION: CPT

## 2019-02-15 PROCEDURE — 93306 TTE W/DOPPLER COMPLETE: CPT

## 2019-02-15 PROCEDURE — 85027 COMPLETE CBC AUTOMATED: CPT

## 2019-02-15 PROCEDURE — 93017 CV STRESS TEST TRACING ONLY: CPT

## 2019-02-15 PROCEDURE — 80048 BASIC METABOLIC PNL TOTAL CA: CPT

## 2019-02-15 PROCEDURE — 85610 PROTHROMBIN TIME: CPT

## 2019-02-15 PROCEDURE — A9502: CPT

## 2019-02-15 PROCEDURE — C1894: CPT

## 2019-02-15 PROCEDURE — C1725: CPT

## 2019-02-15 PROCEDURE — 36415 COLL VENOUS BLD VENIPUNCTURE: CPT

## 2019-02-15 PROCEDURE — 85025 COMPLETE CBC W/AUTO DIFF WBC: CPT

## 2019-02-15 PROCEDURE — 83036 HEMOGLOBIN GLYCOSYLATED A1C: CPT

## 2019-02-15 PROCEDURE — C1769: CPT

## 2019-02-15 PROCEDURE — 84443 ASSAY THYROID STIM HORMONE: CPT

## 2019-02-15 PROCEDURE — C1885: CPT

## 2019-02-15 PROCEDURE — 93458 L HRT ARTERY/VENTRICLE ANGIO: CPT

## 2019-02-15 PROCEDURE — 83735 ASSAY OF MAGNESIUM: CPT

## 2019-02-15 PROCEDURE — 84484 ASSAY OF TROPONIN QUANT: CPT

## 2019-03-28 ENCOUNTER — APPOINTMENT (OUTPATIENT)
Dept: NEUROSURGERY | Facility: CLINIC | Age: 69
End: 2019-03-28
Payer: MEDICARE

## 2019-03-28 VITALS
RESPIRATION RATE: 16 BRPM | BODY MASS INDEX: 24.86 KG/M2 | SYSTOLIC BLOOD PRESSURE: 154 MMHG | OXYGEN SATURATION: 98 % | TEMPERATURE: 98.1 F | HEART RATE: 55 BPM | WEIGHT: 164 LBS | HEIGHT: 68 IN | DIASTOLIC BLOOD PRESSURE: 77 MMHG

## 2019-03-28 DIAGNOSIS — M54.10 RADICULOPATHY, SITE UNSPECIFIED: ICD-10-CM

## 2019-03-28 DIAGNOSIS — M25.551 PAIN IN RIGHT HIP: ICD-10-CM

## 2019-03-28 DIAGNOSIS — M25.552 PAIN IN RIGHT HIP: ICD-10-CM

## 2019-03-28 DIAGNOSIS — M51.36 OTHER INTERVERTEBRAL DISC DEGENERATION, LUMBAR REGION: ICD-10-CM

## 2019-03-28 PROCEDURE — 99204 OFFICE O/P NEW MOD 45 MIN: CPT

## 2019-04-04 NOTE — HISTORY OF PRESENT ILLNESS
[FreeTextEntry1] : Bilateral Leg and hip pain [de-identified] : This is a 69 year old man here for evaluation of chronic bilateral leg and hip pain. He essentially failed conservative management.

## 2019-04-04 NOTE — REVIEW OF SYSTEMS
[Feeling Poorly] : feeling poorly [Poor Coordination] : poor coordination [Numbness] : numbness [Tingling] : tingling [Abnormal Sensation] : an abnormal sensation [Limping] : limping [Arthralgias] : arthralgias [Joint Pain] : joint pain [Limb Pain] : limb pain [Negative] : Heme/Lymph [Joint Swelling] : no joint swelling [Joint Stiffness] : no joint stiffness

## 2019-04-04 NOTE — ADDENDUM
[FreeTextEntry1] : March 28, 2019\par \par Alexander Pickett MD\par [address] [place] [address]\par [address]\[place] [address]\par \par Re:	Teo Bang \par \par Dear Dr. Pickett:\par \par I saw Teo and his wife in the office today.  As you know, he is a healthy 69-year-old male who has had an approximately 3- to 4-month history of left-worse-than-right bilateral leg and thigh pain with failure of conservative treatment.  He has not had injections due to his being placed on a blood thinner for a stent, but he has had PT for 2 months with no improvement.  He reports the pain is worse in the left thigh and worsens with prolonged sitting.  He additionally reports some similar type of pain on the right side but no clear radicular-type symptoms.  \par \par As mentioned, Teo has had a recent angioplasty stent placed in 2019 here at [facility].  He additionally had a radical prostatectomy, endarterectomy, and angioplasty in the past.  He does drink approximately 3 drinks per week but does not smoke.  He does work part time.  His current medications include Benicar, Brilinta, Dexilant, Bystolic, a diuretic, aspirin, and Tylenol.  His review of systems is notable for the absence of any GI complaints, although he does have some occasional chest pain and shortness of breath.  He denies any neurological weakness.  He is neurologically intact.  On exam, however, he has significant reduction in range of motion of the bilateral hips with pain in the left thigh with hip internal and external rotation.  He additionally has some right thigh pain as well.  I did have the opportunity to review his hip \par x-rays as well as the lumbar spine films, with the lumbar MRI showing evidence of what appears to be a right-sided synovial cyst and a significantly degenerative L5-S1 level but no obvious severe neural foraminal or spinal canal compromise.  On the other hand, there is a fair amount of osteoarthritic disease in both hips, and I do think, given his lack of range of motion, that much of his pain currently is likely coming from an orthopedic cause in his hips rather than spinal disease.  I told him so.  I have referred him to one of our hip experts here at [facility], and I am certainly available to discuss his care at any time.\par \par Thanks so much for your kind referral.\par \par Sincerely,\par \par \par \par Alexander Cormier MD\par \par DL/ag [facility] #[id number]\par \par CC:	Joey Tellez MD\par 	[address]\par 	[address]\[place] 	[place] [address]\par

## 2019-04-04 NOTE — ASSESSMENT
[FreeTextEntry1] : Conservative management recommended.\par Education provided regarding plan of care.\par

## 2019-04-04 NOTE — PHYSICAL EXAM
[General Appearance - Alert] : alert [General Appearance - In No Acute Distress] : in no acute distress [Oriented To Time, Place, And Person] : oriented to person, place, and time [Impaired Insight] : insight and judgment were intact [I: Normal Smell] : smell intact bilaterally [Cranial Nerves Optic (II)] : visual acuity intact bilaterally,  pupils equal round and reactive to light [Cranial Nerves Oculomotor (III)] : extraocular motion intact [Cranial Nerves Trigeminal (V)] : facial sensation intact symmetrically [Cranial Nerves Facial (VII)] : face symmetrical [Cranial Nerves Vestibulocochlear (VIII)] : hearing was intact bilaterally [Cranial Nerves Glossopharyngeal (IX)] : tongue and palate midline [Cranial Nerves Accessory (XI - Cranial And Spinal)] : head turning and shoulder shrug symmetric [Cranial Nerves Hypoglossal (XII)] : there was no tongue deviation with protrusion [Motor Tone] : muscle tone was normal in all four extremities [Motor Strength] : muscle strength was normal in all four extremities [Sclera] : the sclera and conjunctiva were normal [Outer Ear] : the ears and nose were normal in appearance [Neck Appearance] : the appearance of the neck was normal [] : no respiratory distress [Apical Impulse] : the apical impulse was normal [Bowel Sounds] : normal bowel sounds [Skin Color & Pigmentation] : normal skin color and pigmentation [Abnormal Walk] : normal gait

## 2019-04-05 ENCOUNTER — OTHER (OUTPATIENT)
Age: 69
End: 2019-04-05

## 2019-04-11 ENCOUNTER — TRANSCRIPTION ENCOUNTER (OUTPATIENT)
Age: 69
End: 2019-04-11

## 2019-05-09 ENCOUNTER — APPOINTMENT (OUTPATIENT)
Dept: ORTHOPEDIC SURGERY | Facility: CLINIC | Age: 69
End: 2019-05-09

## 2019-05-13 ENCOUNTER — OUTPATIENT (OUTPATIENT)
Dept: OUTPATIENT SERVICES | Facility: HOSPITAL | Age: 69
LOS: 1 days | End: 2019-05-13
Payer: MEDICARE

## 2019-05-13 ENCOUNTER — INPATIENT (INPATIENT)
Facility: HOSPITAL | Age: 69
LOS: 0 days | Discharge: HOME | DRG: 247 | End: 2019-05-14
Attending: HOSPITALIST | Admitting: HOSPITALIST
Payer: COMMERCIAL

## 2019-05-13 VITALS
HEART RATE: 75 BPM | WEIGHT: 162.92 LBS | SYSTOLIC BLOOD PRESSURE: 173 MMHG | OXYGEN SATURATION: 100 % | DIASTOLIC BLOOD PRESSURE: 79 MMHG | HEIGHT: 68 IN | TEMPERATURE: 98 F | RESPIRATION RATE: 18 BRPM

## 2019-05-13 DIAGNOSIS — R07.9 CHEST PAIN, UNSPECIFIED: ICD-10-CM

## 2019-05-13 DIAGNOSIS — I70.201 UNSPECIFIED ATHEROSCLEROSIS OF NATIVE ARTERIES OF EXTREMITIES, RIGHT LEG: Chronic | ICD-10-CM

## 2019-05-13 DIAGNOSIS — Z90.79 ACQUIRED ABSENCE OF OTHER GENITAL ORGAN(S): Chronic | ICD-10-CM

## 2019-05-13 DIAGNOSIS — I20.0 UNSTABLE ANGINA: ICD-10-CM

## 2019-05-13 PROCEDURE — 92928 PRQ TCAT PLMT NTRAC ST 1 LES: CPT | Mod: LD

## 2019-05-13 PROCEDURE — 93010 ELECTROCARDIOGRAM REPORT: CPT

## 2019-05-13 PROCEDURE — 93458 L HRT ARTERY/VENTRICLE ANGIO: CPT | Mod: 26,XU

## 2019-05-13 PROCEDURE — 78452 HT MUSCLE IMAGE SPECT MULT: CPT | Mod: 26

## 2019-05-13 PROCEDURE — 93016 CV STRESS TEST SUPVJ ONLY: CPT

## 2019-05-13 PROCEDURE — 99285 EMERGENCY DEPT VISIT HI MDM: CPT | Mod: 25

## 2019-05-13 PROCEDURE — 71045 X-RAY EXAM CHEST 1 VIEW: CPT | Mod: 26

## 2019-05-13 PROCEDURE — 99223 1ST HOSP IP/OBS HIGH 75: CPT

## 2019-05-13 PROCEDURE — 93018 CV STRESS TEST I&R ONLY: CPT

## 2019-05-13 PROCEDURE — 92978 ENDOLUMINL IVUS OCT C 1ST: CPT | Mod: 26

## 2019-05-13 RX ORDER — SODIUM CHLORIDE 9 MG/ML
1000 INJECTION INTRAMUSCULAR; INTRAVENOUS; SUBCUTANEOUS
Refills: 0 | Status: DISCONTINUED | OUTPATIENT
Start: 2019-05-13 | End: 2019-05-13

## 2019-05-13 RX ORDER — LOSARTAN POTASSIUM 100 MG/1
100 TABLET, FILM COATED ORAL DAILY
Refills: 0 | Status: DISCONTINUED | OUTPATIENT
Start: 2019-05-13 | End: 2019-05-14

## 2019-05-13 RX ORDER — NITROGLYCERIN 6.5 MG
0.4 CAPSULE, EXTENDED RELEASE ORAL
Refills: 0 | Status: DISCONTINUED | OUTPATIENT
Start: 2019-05-13 | End: 2019-05-14

## 2019-05-13 RX ORDER — POTASSIUM CHLORIDE 20 MEQ
10 PACKET (EA) ORAL
Refills: 0 | Status: COMPLETED | OUTPATIENT
Start: 2019-05-13 | End: 2019-05-13

## 2019-05-13 RX ORDER — POTASSIUM CHLORIDE 20 MEQ
40 PACKET (EA) ORAL EVERY 4 HOURS
Refills: 0 | Status: COMPLETED | OUTPATIENT
Start: 2019-05-13 | End: 2019-05-13

## 2019-05-13 RX ORDER — ASPIRIN/CALCIUM CARB/MAGNESIUM 324 MG
81 TABLET ORAL DAILY
Refills: 0 | Status: DISCONTINUED | OUTPATIENT
Start: 2019-05-14 | End: 2019-05-14

## 2019-05-13 RX ORDER — METOPROLOL TARTRATE 50 MG
25 TABLET ORAL ONCE
Refills: 0 | Status: COMPLETED | OUTPATIENT
Start: 2019-05-13 | End: 2019-05-13

## 2019-05-13 RX ORDER — ATORVASTATIN CALCIUM 80 MG/1
80 TABLET, FILM COATED ORAL AT BEDTIME
Refills: 0 | Status: DISCONTINUED | OUTPATIENT
Start: 2019-05-13 | End: 2019-05-14

## 2019-05-13 RX ORDER — SODIUM CHLORIDE 9 MG/ML
500 INJECTION INTRAMUSCULAR; INTRAVENOUS; SUBCUTANEOUS
Refills: 0 | Status: DISCONTINUED | OUTPATIENT
Start: 2019-05-13 | End: 2019-05-14

## 2019-05-13 RX ORDER — OLMESARTAN MEDOXOMIL-HYDROCHLOROTHIAZIDE 25; 40 MG/1; MG/1
1 TABLET, FILM COATED ORAL
Qty: 0 | Refills: 0 | DISCHARGE

## 2019-05-13 RX ORDER — NEBIVOLOL HYDROCHLORIDE 5 MG/1
1 TABLET ORAL
Qty: 0 | Refills: 0 | DISCHARGE

## 2019-05-13 RX ORDER — NEBIVOLOL HYDROCHLORIDE 5 MG/1
5 TABLET ORAL
Refills: 0 | Status: DISCONTINUED | OUTPATIENT
Start: 2019-05-13 | End: 2019-05-14

## 2019-05-13 RX ORDER — TICAGRELOR 90 MG/1
90 TABLET ORAL
Refills: 0 | Status: DISCONTINUED | OUTPATIENT
Start: 2019-05-13 | End: 2019-05-14

## 2019-05-13 RX ORDER — PANTOPRAZOLE SODIUM 20 MG/1
40 TABLET, DELAYED RELEASE ORAL
Refills: 0 | Status: DISCONTINUED | OUTPATIENT
Start: 2019-05-13 | End: 2019-05-14

## 2019-05-13 RX ADMIN — SODIUM CHLORIDE 75 MILLILITER(S): 9 INJECTION INTRAMUSCULAR; INTRAVENOUS; SUBCUTANEOUS at 20:30

## 2019-05-13 RX ADMIN — Medication 25 MILLIGRAM(S): at 12:52

## 2019-05-13 RX ADMIN — TICAGRELOR 90 MILLIGRAM(S): 90 TABLET ORAL at 19:06

## 2019-05-13 RX ADMIN — Medication 40 MILLIEQUIVALENT(S): at 20:27

## 2019-05-13 RX ADMIN — Medication 0.4 MILLIGRAM(S): at 12:30

## 2019-05-13 RX ADMIN — Medication 100 MILLIEQUIVALENT(S): at 15:04

## 2019-05-13 RX ADMIN — Medication 100 MILLIEQUIVALENT(S): at 13:25

## 2019-05-13 RX ADMIN — ATORVASTATIN CALCIUM 80 MILLIGRAM(S): 80 TABLET, FILM COATED ORAL at 21:56

## 2019-05-13 RX ADMIN — SODIUM CHLORIDE 75 MILLILITER(S): 9 INJECTION INTRAMUSCULAR; INTRAVENOUS; SUBCUTANEOUS at 14:10

## 2019-05-13 RX ADMIN — Medication 40 MILLIEQUIVALENT(S): at 13:25

## 2019-05-13 NOTE — H&P ADULT - NSHPPHYSICALEXAM_GEN_ALL_CORE
NAD  166/70 HR 70  Neck: no JVD or bruits  Chest: CTA b/l  Cor: S1 S2 RRR, no murmurs  Abd: soft, ND, NT  Ext: no edema, well perfused  R groin with well healed surgical scar  Vasc: femoral 2+ b/l with no bruits b/l, radial 2+ b/l, DP/PT 1+ b/l  Skin: no rash, warm/dry NAD  166/70 HR 70  Neck: no JVD or bruits  Chest: CTA b/l  Cor: S1 S2 RRR, no murmurs  Abd: soft, ND, NT  Ext: no edema, well perfused  R groin with well healed surgical scar  Vasc: femoral 2+ b/l with no bruits b/l, radial 2+ b/l, DP/PT 1+ b/l  Skin: no rash, warm/dry  Neuro: A+O x3, no gross deficts

## 2019-05-13 NOTE — H&P ADULT - PROBLEM SELECTOR PLAN 1
CP free after SL NTG  x 1 in ED, first troponin neg  took asa/brilinta at home today  K 3.1 - repletion with both PO and IV KCL ordered  consented fro cath with Dr Conner today,     ASA III  Mallampati III  Risks & benefits of procedure and alternative therapy have been explained to the patient including but not limited to: allergic reaction, bleeding w/possible need for blood transfusion, infection, renal and vascular compromise, limb damage, arrhythmia, stroke, vessel dissection/perforation, Myocardial infarction, emergent CABG. Informed consent obtained and in chart.         CAD: continue asa/brilinta 90 BID  crestor and BB, benicar  hold HCTZ given severe hypokalemia, might need to be d/c on KCL at home with HCTZ

## 2019-05-13 NOTE — ED PROVIDER NOTE - OBJECTIVE STATEMENT
69M pmh PCI   p/w cp during stress test w/ concern for LAD occlusion 69M pmh prior smoker, HTN, HLD, GERD, prostate CA s/p sx, CAD s/p stent x2.   p/w cp during stress test w/ concern for LAD occlusion. CP generalized, 6/10, improved w/ nitro & rest. has had recent cp on exertion, no BILL.   Not severe, not tearing or ripping towards back.  No fever/chills/n/v diarrhea. No SOB.

## 2019-05-13 NOTE — H&P ADULT - ASSESSMENT
68 yo male, former smoker, FHX CAD (brother has 4VCABG), PMHx HTN, Hyperlipidemia, GERD, h/o prostate CA s/p radical prostatectomy (2016), CAD s/p HOLLY x 2 LAD, LCX, RCA in 2018 at [facility] complicated by right femoral artery damage tx with femoral endarterectomy , most recent cardiac cath  on 1/31/19 at [facility]  with 1vCAD (90% ISR mLAD), s/p angiosculpt and PTCA of mLAD, Patent stents in LAD, LCX & RCA with uncomplicated cath and post cath course, who now presents via ED on 5/11/19 for a cardiac cath secondary to unstable angina and abnormal stress test today.

## 2019-05-13 NOTE — ED PROVIDER NOTE - CLINICAL SUMMARY MEDICAL DECISION MAKING FREE TEXT BOX
69M pmh prior smoker, HTN, HLD, GERD, prostate CA s/p sx, CAD s/p stent x2.   p/w cp during stress test w/ concern for LAD occlusion. CP generalized, 6/10, improved w/ nitro & rest. has had recent cp on exertion, no BILL. Concern ACS, unstable angina, no e/o STEMI on ekg. Not severe, not tearing or ripping towards back, thus unlikely dissection. Discussed w/ cards, planned for cath, admitted cards.   No fever/chills/n/v diarrhea. No SOB.

## 2019-05-13 NOTE — ED ADULT NURSE NOTE - OBJECTIVE STATEMENT
Pt transferred from in house s/p abnormal stress test.  EKG obtained.  Pt noted with #22 PIV to Left AC, d/c'd replaced with #18 PIV to Right AC.  PT denies N/v/D, SOB, Fevers and Dizziness.  Pt states "I have a total of 4 stents in my heart and just a couple of months ago one of my stents had to be cleaned out."

## 2019-05-13 NOTE — ED PROVIDER NOTE - PROGRESS NOTE DETAILS
Cath assistant at bedside. Recs no asa or berlinta. Cath physician assistant at bedside. Recs no asa or berlinta. Advises admit Dr. Squires Dr. Squires not on call. Discussed w/ Oscar pt accepted for likely cath today.

## 2019-05-13 NOTE — ED PROVIDER NOTE - DIAGNOSTIC INTERPRETATION
Chest x-ray interpreted by ER Physician Dr. Kasper  Findings: heart size normal, no infiltrates, lungs fully expanded, soft tissues appear normal.

## 2019-05-13 NOTE — ED CLERICAL - NS ED CLERK NOTE PRE-ARRIVAL INFORMATION; ADDITIONAL PRE-ARRIVAL INFORMATION
Dr Pepper Patel (sp?) - sent from nuclear stress w/ +EKG changes/cp & concern LAD ischemia. Admit Joey Olivarez, ?Pancho cath today? Dr Pepper Patel (sp?) - sent from nuclear stress w/ +EKG changes/cp & concern LAD ischemia. Admit Maria A Olivarez, ?Pancho cath today?  68 Y/O M JOSE BEING SENT IN BY DR MARIA A MARINELLI FOR COMING FROM STRESS LAB ABNORMAL STRESS TEST GOING TO CATH TODAY ADMIT TO HOSPITAL

## 2019-05-13 NOTE — ED ADULT NURSE NOTE - NSIMPLEMENTINTERV_GEN_ALL_ED
Implemented All Universal Safety Interventions:  [place] to call system. Call bell, personal items and telephone within reach. Instruct patient to call for assistance. Room bathroom lighting operational. Non-slip footwear when patient is off stretcher. Physically safe environment: no spills, clutter or unnecessary equipment. Stretcher in lowest position, wheels locked, appropriate side rails in place.

## 2019-05-13 NOTE — H&P ADULT - HISTORY OF PRESENT ILLNESS
68 yo male, former smoker, FHX CAD (brother has 4VCABG), PMHx HTN, Hyperlipidemia, GERD, h/o prostate CA s/p radical prostatectomy (2016), CAD s/p HOLLY x 2 LAD, LCX, RCA in 2018 at [facility] complicated by right femoral artery damage tx with femoral endarterectomy , most recent cardiac cath  on 1/31/19 at [facility]  with 1vCAD (90% ISR mLAD), s/p angiosculpt and PTCA of mLAD, Patent stents in LAD, LCX & RCA with uncomplicated cath and post cath course, who now presents via ED on 5/11/19 for a cardiac cath secondary to unstable angina and abnormal stress test today.       Pt is on asa 81 mg daily and brilinta 90 mg BID.      CARDIAC CATH  1/31/19 at [facility]: 1vCAD (90% ISR mLAD), s/p angiosculpt and PTCA of mLAD, Patent stents in LAD, LCX & RCA,  EF 65%, LVEDP 21. R radial access site.     ECHO 2/1/19:  mild LVH, normal wall motion, EF 62%, no significant valvular disease, no pericardial effusion. INCOMPLETE     70 yo male, former smoker, FHX CAD (brother has 4VCABG), PMHx HTN, Hyperlipidemia, GERD, h/o prostate CA s/p radical prostatectomy (2016), CAD s/p HOLLY x 2 LAD, LCX, RCA in 2018 at [facility] complicated by right femoral artery damage tx with femoral endarterectomy , most recent cardiac cath  on 1/31/19 at [facility]  with 1vCAD (90% ISR mLAD), s/p angiosculpt and PTCA of mLAD, Patent stents in LAD, LCX & RCA with uncomplicated cath and post cath course, who now presents via ED on 5/11/19 for a cardiac cath secondary to unstable angina and abnormal stress test today.       Pt is on asa 81 mg daily and brilinta 90 mg BID.      CARDIAC CATH  1/31/19 at [facility]: 1vCAD (90% ISR mLAD), s/p angiosculpt and PTCA of mLAD, Patent stents in LAD, LCX & RCA,  EF 65%, LVEDP 21. R radial access site.     ECHO 2/1/19:  mild LVH, normal wall motion, EF 62%, no significant valvular disease, no pericardial effusion. 68 yo male, former smoker, FHX CAD (brother has 4VCABG), PMHx HTN, Hyperlipidemia, GERD, h/o prostate CA s/p radical prostatectomy (2016), CAD s/p HOLLY x 2 LAD, LCX, RCA in 2018 at [facility] complicated by right femoral artery damage tx with femoral endarterectomy , most recent cardiac cath  on 1/31/19 at [facility]  with 1vCAD (90% ISR mLAD), s/p angiosculpt and PTCA of mLAD, Patent stents in LAD, LCX & RCA with uncomplicated cath and post cath course, who now presents via ED on 5/11/19 for a cardiac cath secondary to unstable angina and abnormal stress test today.       Pt is on asa 81 mg daily and brilinta 90 mg BID.      CARDIAC CATH  1/31/19 at [facility]: 1vCAD (90% ISR mLAD), s/p angiosculpt and PTCA of mLAD, Patent stents in LAD, LCX & RCA,  EF 65%, LVEDP 21. R radial access site.     ECHO 2/1/19:  mild LVH, normal wall motion, EF 62%, no significant valvular disease, no pericardial effusion. 70 yo male, former smoker, FHX CAD (brother has 4VCABG), PMHx HTN, Hyperlipidemia, GERD, h/o prostate CA s/p radical prostatectomy (2016), CAD s/p HOLLY x 2 LAD, LCX, RCA in 2018 at [facility] complicated by right femoral artery damage tx with femoral endarterectomy , most recent cardiac cath  on 1/31/19 at [facility]  with 1vCAD (90% ISR mLAD), s/p angiosculpt and PTCA of mLAD, Patent stents in LAD, LCX & RCA with uncomplicated cath and post cath course, who now presents via ED on 5/11/19 for a cardiac cath secondary to unstable angina and abnormal stress test today.      In ED initial VS:  /79, HR 70's, RR 18,  O2 sat 100% RA.  EKG NSR unchanged from before. Troponin neg x 1. Received SL NTG x 1 with resolution of CP and metoprolol PO 25mg po x 1 for elevated BP.  Plan for a cardiac cath today with Dr Conner, patient already took asa 81 and brilinta 90mg today.      Patient is a very active man who exercises regularly without any BILL, dizziness, syncope.   Pt is on asa 81 mg daily and brilinta 90 mg BID, and states has been compliant with exception of stopping brilinta for 5 days about 2 weeks ago at the recommendations of his rheumatologist  for hip cortisone shots. Since then he noted daily exertional chest discomfort 2-3/10 in severity, non radiating and always that self limited and resolves with rest. Today, while undergoing  outpatient exercise stress test he  developed 6/10 chest discomfort  6 min into exercise with reportedly EKG changes.  Patient continued to have 2/10 chest discomfort since and it was resolved in ED after SL NTG x 1. In ED initial VS:  /79, HR 70's, RR 18,  O2 sat 100% RA.  EKG NSR unchanged from before. Troponin neg x 1.  Plan for a cardiac cath today with Dr Conner, patient already took asa 81 and brilinta 90mg today.      CARDIAC CATH  1/31/19 at [facility]: 1vCAD (90% ISR mLAD), s/p angiosculpt and PTCA of mLAD, Patent stents in LAD, LCX & RCA,  EF 65%, LVEDP 21. R radial access site.     ECHO 2/1/19:  mild LVH, normal wall motion, EF 62%, no significant valvular disease, no pericardial effusion.        CARDIAC CATH  1/31/19 at [facility]: 1vCAD (90% ISR mLAD), s/p angiosculpt and PTCA of mLAD, Patent stents in LAD, LCX & RCA,  EF 65%, LVEDP 21. R radial access site.     ECHO 2/1/19:  mild LVH, normal wall motion, EF 62%, no significant valvular disease, no pericardial effusion. 70 yo male, former smoker, FHX CAD (brother has 4VCABG), PMHx HTN, Hyperlipidemia, GERD, h/o prostate CA s/p radical prostatectomy (2016), CAD s/p HOLLY x 2 LAD, LCX, RCA in 2018 at [facility] complicated by right femoral artery damage tx with femoral endarterectomy , most recent cardiac cath  on 1/31/19 at [facility]  with 1vCAD (90% ISR mLAD), s/p angiosculpt and PTCA of mLAD, Patent stents in LAD, LCX & RCA with uncomplicated cath and post cath course, who now presents via ED on 5/11/19 for a cardiac cath secondary to unstable angina and abnormal stress test today.    In ED initial VS:  /79, HR 70's, RR 18,  O2 sat 100% RA.  EKG NSR unchanged from before. Troponin neg x 1. Received SL NTG x 1 with resolution of CP and metoprolol PO 25mg po x 1 for elevated BP.  Plan for a cardiac cath today with Dr Conner, patient already took asa 81 and brilinta 90mg today.      Patient is a very active man who exercises regularly without any BILL, dizziness, syncope.   Pt is on asa 81 mg daily and brilinta 90 mg BID, and states has been compliant with exception of stopping brilinta for 5 days about 2 weeks ago at the recommendations of his rheumatologist  for hip cortisone shots. Since then he noted daily exertional chest discomfort 2-3/10 in severity, non radiating and always that self limited and resolves with rest. Today, while undergoing  outpatient exercise stress test he  developed 6/10 chest discomfort  6 min into exercise with reportedly EKG changes.  Patient continued to have 2/10 chest discomfort since and it was resolved in ED after SL NTG x 1. In ED initial VS:  /79, HR 70's, RR 18,  O2 sat 100% RA.  EKG NSR unchanged from before. Troponin neg x 1.  Plan for a cardiac cath today with Dr Conner, patient already took asa 81 and brilinta 90mg today.      CARDIAC CATH  1/31/19 at [facility]: 1vCAD (90% ISR mLAD), s/p angiosculpt and PTCA of mLAD, Patent stents in LAD, LCX & RCA,  EF 65%, LVEDP 21. R radial access site.     ECHO 2/1/19:  mild LVH, normal wall motion, EF 62%, no significant valvular disease, no pericardial effusion.        CARDIAC CATH  1/31/19 at [facility]: 1vCAD (90% ISR mLAD), s/p angiosculpt and PTCA of mLAD, Patent stents in LAD, LCX & RCA,  EF 65%, LVEDP 21. R radial access site.     ECHO 2/1/19:  mild LVH, normal wall motion, EF 62%, no significant valvular disease, no pericardial effusion.

## 2019-05-13 NOTE — H&P ADULT - ATTENDING COMMENTS
69M w/ pmh of HTN, HLD, CAD s/p CABG and multiple PCI and h/o Prostate CA s/p prostatectomy in 2016 initially present for nuclear stress test for anginal Sx -> positive nuc stress test, admitted for Cardiac cath    -Pt. is s/p LHC -> found to have 95% ISR of mLAD, s/p HOLLY x 1; Pt. currently CP free  -CAD - c/w ASA/Brillinta and Lipitor; Restart Bystolic 5mg BID  -HTN - Restart Benicar and HCTZ  -DASH diet  -Ambulate  /  OOB to chair  -DVT PPx: HSQ  -Dispo: d/c Home w/ outpatient f/u w/ Dr. Tellez(Cardiology)  -Full Code    Norman Recio MD  Cardiology Attending

## 2019-05-13 NOTE — H&P ADULT - NSHPLABSRESULTS_GEN_ALL_CORE
pending CARDIAC MARKERS ( 13 May 2019 12:03 )  x     / <0.01 ng/mL / x     / x     / x        05-13    141  |  100  |  15  ----------------------------<  117<H>  3.1<L>   |  27  |  1.16    Ca    9.6      13 May 2019 12:03    TPro  7.5  /  Alb  4.8  /  TBili  0.7  /  DBili  x   /  AST  22  /  ALT  28  /  AlkPhos  79  05-13                            14.4   8.13  )-----------( 231      ( 13 May 2019 12:03 )             41.5

## 2019-05-13 NOTE — H&P ADULT - NSICDXPASTSURGICALHX_GEN_ALL_CORE_FT
PAST SURGICAL HISTORY:  Femoral artery occlusion, right treated with entarterectomy    H/O radical prostatectomy

## 2019-05-14 ENCOUNTER — TRANSCRIPTION ENCOUNTER (OUTPATIENT)
Age: 69
End: 2019-05-14

## 2019-05-14 VITALS — TEMPERATURE: 98 F

## 2019-05-14 DIAGNOSIS — R07.9 CHEST PAIN, UNSPECIFIED: ICD-10-CM

## 2019-05-14 LAB
ANION GAP SERPL CALC-SCNC: 11 MMOL/L — SIGNIFICANT CHANGE UP (ref 5–17)
BUN SERPL-MCNC: 18 MG/DL — SIGNIFICANT CHANGE UP (ref 7–23)
CALCIUM SERPL-MCNC: 8.8 MG/DL — SIGNIFICANT CHANGE UP (ref 8.4–10.5)
CHLORIDE SERPL-SCNC: 108 MMOL/L — SIGNIFICANT CHANGE UP (ref 96–108)
CHOLEST SERPL-MCNC: 97 MG/DL — SIGNIFICANT CHANGE UP (ref 10–199)
CO2 SERPL-SCNC: 24 MMOL/L — SIGNIFICANT CHANGE UP (ref 22–31)
CREAT SERPL-MCNC: 1.05 MG/DL — SIGNIFICANT CHANGE UP (ref 0.5–1.3)
GLUCOSE SERPL-MCNC: 100 MG/DL — HIGH (ref 70–99)
HBA1C BLD-MCNC: 5.9 % — HIGH (ref 4–5.6)
HCT VFR BLD CALC: 36.4 % — LOW (ref 39–50)
HDLC SERPL-MCNC: 38 MG/DL — LOW
HGB BLD-MCNC: 12.4 G/DL — LOW (ref 13–17)
LIPID PNL WITH DIRECT LDL SERPL: 37 MG/DL — SIGNIFICANT CHANGE UP
MAGNESIUM SERPL-MCNC: 1.9 MG/DL — SIGNIFICANT CHANGE UP (ref 1.6–2.6)
MCHC RBC-ENTMCNC: 29.7 PG — SIGNIFICANT CHANGE UP (ref 27–34)
MCHC RBC-ENTMCNC: 34.1 GM/DL — SIGNIFICANT CHANGE UP (ref 32–36)
MCV RBC AUTO: 87.1 FL — SIGNIFICANT CHANGE UP (ref 80–100)
NRBC # BLD: 0 /100 WBCS — SIGNIFICANT CHANGE UP (ref 0–0)
PLATELET # BLD AUTO: 198 K/UL — SIGNIFICANT CHANGE UP (ref 150–400)
POTASSIUM SERPL-MCNC: 3.9 MMOL/L — SIGNIFICANT CHANGE UP (ref 3.5–5.3)
POTASSIUM SERPL-SCNC: 3.9 MMOL/L — SIGNIFICANT CHANGE UP (ref 3.5–5.3)
RBC # BLD: 4.18 M/UL — LOW (ref 4.2–5.8)
RBC # FLD: 13.4 % — SIGNIFICANT CHANGE UP (ref 10.3–14.5)
SODIUM SERPL-SCNC: 143 MMOL/L — SIGNIFICANT CHANGE UP (ref 135–145)
TOTAL CHOLESTEROL/HDL RATIO MEASUREMENT: 2.6 RATIO — LOW (ref 3.4–9.6)
TRIGL SERPL-MCNC: 112 MG/DL — SIGNIFICANT CHANGE UP (ref 10–149)
WBC # BLD: 6.77 K/UL — SIGNIFICANT CHANGE UP (ref 3.8–10.5)
WBC # FLD AUTO: 6.77 K/UL — SIGNIFICANT CHANGE UP (ref 3.8–10.5)

## 2019-05-14 PROCEDURE — 80053 COMPREHEN METABOLIC PANEL: CPT

## 2019-05-14 PROCEDURE — C1725: CPT

## 2019-05-14 PROCEDURE — 85027 COMPLETE CBC AUTOMATED: CPT

## 2019-05-14 PROCEDURE — 84484 ASSAY OF TROPONIN QUANT: CPT

## 2019-05-14 PROCEDURE — 83735 ASSAY OF MAGNESIUM: CPT

## 2019-05-14 PROCEDURE — C1769: CPT

## 2019-05-14 PROCEDURE — 99285 EMERGENCY DEPT VISIT HI MDM: CPT

## 2019-05-14 PROCEDURE — 36415 COLL VENOUS BLD VENIPUNCTURE: CPT

## 2019-05-14 PROCEDURE — 92929: CPT

## 2019-05-14 PROCEDURE — 93458 L HRT ARTERY/VENTRICLE ANGIO: CPT

## 2019-05-14 PROCEDURE — 93005 ELECTROCARDIOGRAM TRACING: CPT

## 2019-05-14 PROCEDURE — C1753: CPT

## 2019-05-14 PROCEDURE — 71045 X-RAY EXAM CHEST 1 VIEW: CPT

## 2019-05-14 PROCEDURE — 85610 PROTHROMBIN TIME: CPT

## 2019-05-14 PROCEDURE — 85730 THROMBOPLASTIN TIME PARTIAL: CPT

## 2019-05-14 PROCEDURE — C1887: CPT

## 2019-05-14 PROCEDURE — A9500: CPT

## 2019-05-14 PROCEDURE — 86803 HEPATITIS C AB TEST: CPT

## 2019-05-14 PROCEDURE — 92928 PRQ TCAT PLMT NTRAC ST 1 LES: CPT

## 2019-05-14 PROCEDURE — 80048 BASIC METABOLIC PNL TOTAL CA: CPT

## 2019-05-14 PROCEDURE — 92943 PRQ TRLUML REVSC CH OCC ANT: CPT

## 2019-05-14 PROCEDURE — 92978 ENDOLUMINL IVUS OCT C 1ST: CPT

## 2019-05-14 PROCEDURE — 83036 HEMOGLOBIN GLYCOSYLATED A1C: CPT

## 2019-05-14 PROCEDURE — 85025 COMPLETE CBC W/AUTO DIFF WBC: CPT

## 2019-05-14 PROCEDURE — 80061 LIPID PANEL: CPT

## 2019-05-14 PROCEDURE — C1874: CPT

## 2019-05-14 PROCEDURE — 99239 HOSP IP/OBS DSCHRG MGMT >30: CPT

## 2019-05-14 PROCEDURE — C1894: CPT

## 2019-05-14 PROCEDURE — 78452 HT MUSCLE IMAGE SPECT MULT: CPT

## 2019-05-14 PROCEDURE — 93017 CV STRESS TEST TRACING ONLY: CPT

## 2019-05-14 PROCEDURE — 93010 ELECTROCARDIOGRAM REPORT: CPT

## 2019-05-14 RX ORDER — MAGNESIUM OXIDE 400 MG ORAL TABLET 241.3 MG
400 TABLET ORAL ONCE
Refills: 0 | Status: COMPLETED | OUTPATIENT
Start: 2019-05-14 | End: 2019-05-14

## 2019-05-14 RX ORDER — POTASSIUM CHLORIDE 20 MEQ
20 PACKET (EA) ORAL ONCE
Refills: 0 | Status: COMPLETED | OUTPATIENT
Start: 2019-05-14 | End: 2019-05-14

## 2019-05-14 RX ORDER — ASPIRIN/CALCIUM CARB/MAGNESIUM 324 MG
1 TABLET ORAL
Qty: 30 | Refills: 11
Start: 2019-05-14 | End: 2020-05-07

## 2019-05-14 RX ORDER — ASPIRIN/CALCIUM CARB/MAGNESIUM 324 MG
1 TABLET ORAL
Qty: 0 | Refills: 0 | DISCHARGE

## 2019-05-14 RX ORDER — TICAGRELOR 90 MG/1
1 TABLET ORAL
Qty: 60 | Refills: 11
Start: 2019-05-14 | End: 2020-05-07

## 2019-05-14 RX ADMIN — MAGNESIUM OXIDE 400 MG ORAL TABLET 400 MILLIGRAM(S): 241.3 TABLET ORAL at 11:17

## 2019-05-14 RX ADMIN — LOSARTAN POTASSIUM 100 MILLIGRAM(S): 100 TABLET, FILM COATED ORAL at 06:37

## 2019-05-14 RX ADMIN — TICAGRELOR 90 MILLIGRAM(S): 90 TABLET ORAL at 06:37

## 2019-05-14 RX ADMIN — Medication 20 MILLIEQUIVALENT(S): at 11:17

## 2019-05-14 RX ADMIN — Medication 81 MILLIGRAM(S): at 11:17

## 2019-05-14 RX ADMIN — NEBIVOLOL HYDROCHLORIDE 5 MILLIGRAM(S): 5 TABLET ORAL at 07:42

## 2019-05-14 RX ADMIN — PANTOPRAZOLE SODIUM 40 MILLIGRAM(S): 20 TABLET, DELAYED RELEASE ORAL at 06:37

## 2019-05-14 NOTE — DISCHARGE NOTE PROVIDER - HOSPITAL COURSE
70 y/o M Ex-Smoker w/ PMHX HTN, HLD, GERD, h/o Prostate CAD, CAD s/p prior PCI w/ unstable angina and abnormal NST.         Pt s/p cardiac angiogram on 5/13/19: HOLLY mLAD, EF normal    VSS, Pt Asymptomatic, Wrist Stable, Labs and EKG reviewed    Hypokalemia resolved.     Pt stable for D/C home as discussed w/ Dr. Recio on ASA, Brilinta, Bystolic, Benicar,  Crestor 20mg QHS and HCTZ. Repeat potassium with Primary Doctor in 2 days. See Dr. Tellez in 1 week.    Vital Signs Last 24 Hrs    T(C): 36.9 (14 May 2019 10:20), Max: 37.1 (13 May 2019 22:06)    T(F): 98.4 (14 May 2019 10:20), Max: 98.8 (13 May 2019 22:06)    HR: 57 (14 May 2019 08:14) (54 - 58)    BP: 139/64 (14 May 2019 08:14) (110/55 - 139/64)    RR: 17 (14 May 2019 08:14) (16 - 17)    SpO2: 95% (14 May 2019 08:14) (95% - 100%)    Pt seen and examined bedside.    Patient denies C/P, SOB, N/V, dizziness, palpitations, and diaphoresis.    Pt denies fever/chills, dysuria, abdominal pain, diarrhea, and cough    	    GEN: NAD    PULM:  CTA B/L    CARD: No JVD B/L, RRR, S1 and S2     ABD: +BS, NT, soft/ND	    EXT: No Edema B/L LE    R WRIST: soft, no hematoma, no bleeding, radial pulse 2+    NEURO: A+Ox3, no focal deficit    PSYCH: Mood appropriate                                 12.4     6.77  )-----------( 198      ( 14 May 2019 06:29 )               36.4         05-14        143  |  108  |  18    ----------------------------<  100<H>    3.9   |  24  |  1.05    K repleted w/ Potassium 20meq PO    Ca    8.8      14 May 2019 06:29    Mg     1.9     05-14    Mg repleted w/ Magnesium 400mg PO    TPro  7.5  /  Alb  4.8  /  TBili  0.7  /  DBili  x   /  AST  22  /  ALT  28  /  AlkPhos  79  05-13

## 2019-05-14 NOTE — DISCHARGE NOTE PROVIDER - NSDCCPCAREPLAN_GEN_ALL_CORE_FT
PRINCIPAL DISCHARGE DIAGNOSIS  Diagnosis: CAD (coronary artery disease)  Assessment and Plan of Treatment: CONTINUE ASPIRIN DAILY AND BRILINTA (TICAGRELOR) TWICE DAILY. Continue current listed medical regimen. See Primary Doctor in 2 days. See Dr. Tellez in 1 week.  Monitor right wrist/arm for swelling, bleeding, discharge, pain or skin discoloration if any of these findings are noted go to nearest ER, seek medical attention immediately and call [phone] if any questions. If chest pain, shortness of breath, dizziness noted call MD immediately and seek medical attention.  Avoid exertional movements and lifting more than 2 pounds in right hand/arm for 1 week.         SECONDARY DISCHARGE DIAGNOSES  Diagnosis: HLD (hyperlipidemia)  Assessment and Plan of Treatment: Continue current listed medical regimen. MD follow-up    Diagnosis: HTN (hypertension)  Assessment and Plan of Treatment: Continue current listed medical regimen. MD follow-up    Diagnosis: Hypokalemia  Assessment and Plan of Treatment: Low Potassium resolved. Eat a banana daily. See Primary Doctor in 2 days for repeat Potassium. Continue Hydrochlorothiazide now per MD.

## 2019-05-14 NOTE — DISCHARGE NOTE PROVIDER - CARE PROVIDER_API CALL
Joey Tellez)  Cardiovascular Medicine  [address], [address]  [place] [address]  Phone: [phone]  Fax: [phone]  Follow Up Time:

## 2019-05-14 NOTE — DISCHARGE NOTE NURSING/CASE MANAGEMENT/SOCIAL WORK - NSDCDPATPORTLINK_GEN_ALL_CORE
You can access the [facility] Portal, offered by [facility], by registering with the following website: [email or web address]

## 2019-05-16 DIAGNOSIS — I25.110 ATHEROSCLEROTIC HEART DISEASE OF NATIVE CORONARY ARTERY WITH UNSTABLE ANGINA PECTORIS: ICD-10-CM

## 2019-05-16 DIAGNOSIS — Z85.46 PERSONAL HISTORY OF MALIGNANT NEOPLASM OF PROSTATE: ICD-10-CM

## 2019-05-16 DIAGNOSIS — K21.9 GASTRO-ESOPHAGEAL REFLUX DISEASE WITHOUT ESOPHAGITIS: ICD-10-CM

## 2019-05-16 DIAGNOSIS — Z87.891 PERSONAL HISTORY OF NICOTINE DEPENDENCE: ICD-10-CM

## 2019-05-16 DIAGNOSIS — Z95.5 PRESENCE OF CORONARY ANGIOPLASTY IMPLANT AND GRAFT: ICD-10-CM

## 2019-05-16 DIAGNOSIS — I10 ESSENTIAL (PRIMARY) HYPERTENSION: ICD-10-CM
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
## 2019-05-16 DIAGNOSIS — T82.855A STENOSIS OF CORONARY ARTERY STENT, INITIAL ENCOUNTER: ICD-10-CM
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
## 2019-05-16 DIAGNOSIS — E87.6 HYPOKALEMIA: ICD-10-CM

## 2019-05-16 DIAGNOSIS — Z90.79 ACQUIRED ABSENCE OF OTHER GENITAL ORGAN(S): ICD-10-CM

## 2019-05-22 LAB
HCV AB S/CO SERPL IA: 0.03 S/CO — SIGNIFICANT CHANGE UP
HCV AB SERPL-IMP: SIGNIFICANT CHANGE UP

## 2019-06-13 VITALS
SYSTOLIC BLOOD PRESSURE: 120 MMHG | OXYGEN SATURATION: 100 % | WEIGHT: 164.91 LBS | RESPIRATION RATE: 16 BRPM | TEMPERATURE: 97 F | HEART RATE: 64 BPM | DIASTOLIC BLOOD PRESSURE: 62 MMHG | HEIGHT: 68 IN

## 2019-06-13 RX ORDER — CHLORHEXIDINE GLUCONATE 213 G/1000ML
1 SOLUTION TOPICAL ONCE
Refills: 0 | Status: DISCONTINUED | OUTPATIENT
Start: 2019-06-14 | End: 2019-06-14

## 2019-06-13 NOTE — H&P ADULT - ASSESSMENT
70 yo male, former smoker, FHX CAD (brother has 4VCABG), PMHx HTN, Hyperlipidemia, GERD, h/o prostate CA s/p radical prostatectomy (2016), CAD s/p HOLLY x 2 LAD, LCX, RCA in 2018 at [facility] complicated by right femoral artery damage tx with femoral endarterectomy , most recent cardiac cath  on 1/31/19 at [facility]  with 1vCAD (90% ISR mLAD), s/p angiosculpt and PTCA of mLAD, Patent stents in LAD, LCX & RCA with uncomplicated cath and post cath course, who now presents via ED on 5/11/19 for a cardiac cath secondary to unstable angina and abnormal stress test today. 70 yo active male, former smoker, FHX CAD (brother has 4VCABG), PMHx known CAD (multiple PCI's most recently 05/13/2019 @ [facility] ISR mLAD PTCA/HOLLY) HTN, Hyperlipidemia, GERD, h/o prostate CA s/p radical prostatectomy (2016), cardiac cath in 2018 at [facility] complicated by right femoral artery damage tx with femoral endarterectomy presented CCS III and was found to have abnormal stress test was referred for cardiac catheterization with possible intervention secondary to possible CAD progression.      Risks & benefits of procedure and alternative therapy have been explained to the patient including but not limited to: allergic reaction, bleeding w/possible need for blood transfusion, infection, renal and vascular compromise, limb damage, arrhythmia, stroke, vessel dissection/perforation, Myocardial infarction, emergent CABG. Informed consent obtained and in chart.     Consented  Patient took ASA 81mg po x 1 and Brilinta 90mg po x 1 today prior to arrival to cath lab  Fluids: NS @ 75cc/hr  Labs notable for -------- 70 yo active male, former smoker, FHX CAD (brother has 4VCABG), PMHx known CAD (multiple PCI's most recently 05/13/2019 @ [facility] ISR mLAD PTCA/HOLLY) HTN, Hyperlipidemia, GERD, h/o prostate CA s/p radical prostatectomy (2016), cardiac cath in 2018 at [facility] complicated by right femoral artery damage tx with femoral endarterectomy presented CCS III and was found to have abnormal stress test was referred for cardiac catheterization with possible intervention secondary to possible CAD progression.      Risks & benefits of procedure and alternative therapy have been explained to the patient including but not limited to: allergic reaction, bleeding w/possible need for blood transfusion, infection, renal and vascular compromise, limb damage, arrhythmia, stroke, vessel dissection/perforation, Myocardial infarction, emergent CABG. Informed consent obtained and in chart.     Consented  Patient took ASA 81mg po x 1 and Brilinta 90mg po x 1 today prior to arrival to cath lab  Fluids: NS @ 75cc/hr  Labs notable for K 3.4 repleted with Kdur 40mEq po x 1

## 2019-06-13 NOTE — H&P ADULT - NSICDXPASTMEDICALHX_GEN_ALL_CORE_FT
PAST MEDICAL HISTORY:  CAD (coronary artery disease)     Prostate CA     Stented coronary artery PAST MEDICAL HISTORY:  CAD (coronary artery disease)     GERD (gastroesophageal reflux disease)     HLD (hyperlipidemia)     HTN (hypertension)     Prostate CA     Stented coronary artery

## 2019-06-13 NOTE — H&P ADULT - PROBLEM SELECTOR PLAN 1
CP free after SL NTG  x 1 in ED, first troponin neg  took asa/brilinta at home today  K 3.1 - repletion with both PO and IV KCL ordered  consented fro cath with Dr Cnoner today,     ASA III  Mallampati III  Risks & benefits of procedure and alternative therapy have been explained to the patient including but not limited to: allergic reaction, bleeding w/possible need for blood transfusion, infection, renal and vascular compromise, limb damage, arrhythmia, stroke, vessel dissection/perforation, Myocardial infarction, emergent CABG. Informed consent obtained and in chart.         CAD: continue asa/brilinta 90 BID  crestor and BB, benicar  hold HCTZ given severe hypokalemia, might need to be d/c on KCL at home with HCTZ

## 2019-06-13 NOTE — H&P ADULT - HISTORY OF PRESENT ILLNESS
Confirm meds    Patient is a 70 yo active male, former smoker, FHX CAD (brother has 4VCABG), PMHx known CAD (multiple PCI's most recently 05/13/2019 @ [facility] ISR mLAD PTCA/HOLLY) HTN, Hyperlipidemia, GERD, h/o prostate CA s/p radical prostatectomy (2016), cardiac cath in 2018 at 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 At time he noted daily exertional chest discomfort 2-3/10 in severity, non radiating and always that self limited and resolves with rest. Pt subsequently underwent catheterization on 05/13/2019 received PTCA/HOLLY mLAD. Per Dr. Rankin (verbal report, need to obtain official report from office [phone], late add on) patient came to office this afternoon complaining of 3/10 chest pain, and underwent stress test which revealed 3mm downsloping ST depressions in leads II, III, aVF and V4-V6. Patient states since procedure he is feeling ----. He denies CP, SOB, palpitations, syncope, PND/orthopnea or LE edema. Patient endorses compliance with ASA and Brilinta. ECHO 2/1/19:  mild LVH, normal wall motion, EF 62%, no significant valvular disease, no pericardial effusion.        Cardiac cath Hx:     Cardiac cath 05/13/2019: PTCA/HOLLY mLAD, LM normal, pLAD stent widely patent, dRCA stent widely patent, Circ, OM, LPDA, LP branches mild luminal irregularities, RCA, RPDA, RPL, AM branches mild luminal irregularities, EF 60%    CARDIAC CATH  1/31/19 at [facility]: 1vCAD (90% ISR mLAD), s/p angiosculpt and PTCA of mLAD, Patent stents in LAD, LCX & RCA,  EF 65%, LVEDP 21. R radial access site. 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 At time he noted daily exertional chest discomfort 2-3/10 in severity, non radiating and always that self limited and resolves with rest. Pt subsequently underwent catheterization on 05/13/2019 received PTCA/HOLLY mLAD. Per Dr. Rankin (verbal report, need to obtain official report from office [phone], late add on) patient came to office this afternoon complaining of 3/10 chest pain, and underwent stress test which revealed 3mm downsloping ST depressions in leads II, III, aVF and V4-V6. Patient states since procedure he continues to note intermittent episodes of chest pressure 3/10 severity during his bike rides, which usually start during his bike rides but subside by the end of bike. Although he is experiencing these symptoms he states there much improved from CP experienced prior to cath on 05/2019. He denies SOB, palpitations, syncope, PND/orthopnea or LE edema. Patient endorses compliance with ASA and Brilinta. ECHO 2/1/19:  mild LVH, normal wall motion, EF 62%, no significant valvular disease, no pericardial effusion.        Cardiac cath Hx:     Cardiac cath 05/13/2019: PTCA/HOLLY mLAD, LM normal, pLAD stent widely patent, dRCA stent widely patent, Circ, OM, LPDA, LP branches mild luminal irregularities, RCA, RPDA, RPL, AM branches mild luminal irregularities, EF 60%    CARDIAC CATH  1/31/19 at [facility]: 1vCAD (90% ISR mLAD), s/p angiosculpt and PTCA of mLAD, Patent stents in LAD, LCX & RCA,  EF 65%, LVEDP 21. R radial access site. 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 At time he noted daily exertional chest discomfort 2-3/10 in severity, non radiating and always that self limited and resolves with rest. Pt subsequently underwent catheterization on 05/13/2019 received ISR PTCA/HOLLY mLAD. Per Dr. Rankin (verbal report, need to obtain official report from office [phone], late add on) patient came to office this afternoon complaining of 3/10 chest pain, and underwent stress test which revealed 3mm downsloping ST depressions in leads II, III, aVF and V4-V6. Patient states since procedure he continues to note intermittent episodes of chest pressure 3/10 severity during his bike rides, which usually start at beginning of bike rides but subside by the end of rides. Although he is experiencing these symptoms he states symptoms are much improved from CP experienced prior to cath on 05/2019. He denies SOB, palpitations, syncope, PND/orthopnea or LE edema. Patient endorses compliance with ASA and Brilinta. ECHO 2/1/19:  mild LVH, normal wall motion, EF 62%, no significant valvular disease, no pericardial effusion.        Cardiac cath Hx:     Cardiac cath 05/13/2019: PTCA/HOLLY mLAD, LM normal, pLAD stent widely patent, dRCA stent widely patent, Circ, OM, LPDA, LP branches mild luminal irregularities, RCA, RPDA, RPL, AM branches mild luminal irregularities, EF 60%    CARDIAC CATH  1/31/19 at [facility]: 1vCAD (90% ISR mLAD), s/p angiosculpt and PTCA of mLAD, Patent stents in LAD, LCX & RCA,  EF 65%, LVEDP 21. R radial access site. 70 yo active male, former smoker, FHX CAD (brother has 4VCABG), PMHx known CAD (multiple PCI's most recently 05/13/2019 @ [facility] ISR mLAD PTCA/HOLLY) HTN, Hyperlipidemia, GERD, h/o prostate CA s/p radical prostatectomy (2016), cardiac cath in 2018 at 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 At time he noted daily exertional chest discomfort 2-3/10 in severity, non radiating and always that self limited and resolves with rest. Pt subsequently underwent catheterization on 05/13/2019 received ISR PTCA/HOLLY mLAD. Per Dr. Rankin (verbal report, need to obtain official report from office [phone], late add on) patient came to office this afternoon complaining of 3/10 chest pain, and underwent stress test which revealed 3mm downsloping ST depressions in leads II, III, aVF and V4-V6. Patient states since procedure he continues to note intermittent episodes of chest pressure 3/10 severity during his bike rides, which usually start at beginning of bike rides but subside by the end of rides. Although he is experiencing these symptoms he states symptoms are much improved from CP experienced prior to cath on 05/2019. He denies SOB, palpitations, syncope, PND/orthopnea or LE edema. Patient endorses compliance with ASA and Brilinta. ECHO 2/1/19:  mild LVH, normal wall motion, EF 62%, no significant valvular disease, no pericardial effusion.        Cardiac cath Hx:     Cardiac cath 05/13/2019: PTCA/HOLLY mLAD, LM normal, pLAD stent widely patent, dRCA stent widely patent, Circ, OM, LPDA, LP branches mild luminal irregularities, RCA, RPDA, RPL, AM branches mild luminal irregularities, EF 60%    CARDIAC CATH  1/31/19 at [facility]: 1vCAD (90% ISR mLAD), s/p angiosculpt and PTCA of mLAD, Patent stents in LAD, LCX & RCA,  EF 65%, LVEDP 21. R radial access site.

## 2019-06-14 ENCOUNTER — OUTPATIENT (OUTPATIENT)
Dept: OUTPATIENT SERVICES | Facility: HOSPITAL | Age: 69
LOS: 1 days | Discharge: HOME | End: 2019-06-14
Payer: MEDICARE

## 2019-06-14 DIAGNOSIS — I70.201 UNSPECIFIED ATHEROSCLEROSIS OF NATIVE ARTERIES OF EXTREMITIES, RIGHT LEG: Chronic | ICD-10-CM

## 2019-06-14 DIAGNOSIS — Z90.79 ACQUIRED ABSENCE OF OTHER GENITAL ORGAN(S): Chronic | ICD-10-CM

## 2019-06-14 PROBLEM — Z95.5 PRESENCE OF CORONARY ANGIOPLASTY IMPLANT AND GRAFT: Chronic | Status: ACTIVE | Noted: 2019-05-13

## 2019-06-14 LAB
ALBUMIN SERPL ELPH-MCNC: 4.3 G/DL — SIGNIFICANT CHANGE UP (ref 3.3–5)
ALP SERPL-CCNC: 70 U/L — SIGNIFICANT CHANGE UP (ref 40–120)
ALT FLD-CCNC: 24 U/L — SIGNIFICANT CHANGE UP (ref 10–45)
ANION GAP SERPL CALC-SCNC: 11 MMOL/L — SIGNIFICANT CHANGE UP (ref 5–17)
APTT BLD: 29.7 SEC — SIGNIFICANT CHANGE UP (ref 27.5–36.3)
AST SERPL-CCNC: 22 U/L — SIGNIFICANT CHANGE UP (ref 10–40)
BASOPHILS # BLD AUTO: 0.04 K/UL — SIGNIFICANT CHANGE UP (ref 0–0.2)
BASOPHILS NFR BLD AUTO: 0.6 % — SIGNIFICANT CHANGE UP (ref 0–2)
BILIRUB SERPL-MCNC: 0.7 MG/DL — SIGNIFICANT CHANGE UP (ref 0.2–1.2)
BUN SERPL-MCNC: 14 MG/DL — SIGNIFICANT CHANGE UP (ref 7–23)
CALCIUM SERPL-MCNC: 9.2 MG/DL — SIGNIFICANT CHANGE UP (ref 8.4–10.5)
CHLORIDE SERPL-SCNC: 103 MMOL/L — SIGNIFICANT CHANGE UP (ref 96–108)
CHOLEST SERPL-MCNC: 115 MG/DL — SIGNIFICANT CHANGE UP (ref 10–199)
CO2 SERPL-SCNC: 28 MMOL/L — SIGNIFICANT CHANGE UP (ref 22–31)
CREAT SERPL-MCNC: 1.15 MG/DL — SIGNIFICANT CHANGE UP (ref 0.5–1.3)
CRP SERPL-MCNC: 0.05 MG/DL — SIGNIFICANT CHANGE UP (ref 0–0.4)
EOSINOPHIL # BLD AUTO: 0.1 K/UL — SIGNIFICANT CHANGE UP (ref 0–0.5)
EOSINOPHIL NFR BLD AUTO: 1.5 % — SIGNIFICANT CHANGE UP (ref 0–6)
GLUCOSE SERPL-MCNC: 116 MG/DL — HIGH (ref 70–99)
HBA1C BLD-MCNC: 5.7 % — HIGH (ref 4–5.6)
HCT VFR BLD CALC: 39.6 % — SIGNIFICANT CHANGE UP (ref 39–50)
HDLC SERPL-MCNC: 45 MG/DL — SIGNIFICANT CHANGE UP
HGB BLD-MCNC: 13.5 G/DL — SIGNIFICANT CHANGE UP (ref 13–17)
IMM GRANULOCYTES NFR BLD AUTO: 0.3 % — SIGNIFICANT CHANGE UP (ref 0–1.5)
INR BLD: 1.07 — SIGNIFICANT CHANGE UP (ref 0.88–1.16)
LIPID PNL WITH DIRECT LDL SERPL: 55 MG/DL — SIGNIFICANT CHANGE UP
LYMPHOCYTES # BLD AUTO: 1.01 K/UL — SIGNIFICANT CHANGE UP (ref 1–3.3)
LYMPHOCYTES # BLD AUTO: 15.2 % — SIGNIFICANT CHANGE UP (ref 13–44)
MCHC RBC-ENTMCNC: 29.7 PG — SIGNIFICANT CHANGE UP (ref 27–34)
MCHC RBC-ENTMCNC: 34.1 GM/DL — SIGNIFICANT CHANGE UP (ref 32–36)
MCV RBC AUTO: 87.2 FL — SIGNIFICANT CHANGE UP (ref 80–100)
MONOCYTES # BLD AUTO: 0.93 K/UL — HIGH (ref 0–0.9)
MONOCYTES NFR BLD AUTO: 14 % — SIGNIFICANT CHANGE UP (ref 2–14)
NEUTROPHILS # BLD AUTO: 4.56 K/UL — SIGNIFICANT CHANGE UP (ref 1.8–7.4)
NEUTROPHILS NFR BLD AUTO: 68.4 % — SIGNIFICANT CHANGE UP (ref 43–77)
NRBC # BLD: 0 /100 WBCS — SIGNIFICANT CHANGE UP (ref 0–0)
PLATELET # BLD AUTO: 248 K/UL — SIGNIFICANT CHANGE UP (ref 150–400)
POTASSIUM SERPL-MCNC: 3.4 MMOL/L — LOW (ref 3.5–5.3)
POTASSIUM SERPL-SCNC: 3.4 MMOL/L — LOW (ref 3.5–5.3)
PROT SERPL-MCNC: 6.6 G/DL — SIGNIFICANT CHANGE UP (ref 6–8.3)
PROTHROM AB SERPL-ACNC: 12.1 SEC — SIGNIFICANT CHANGE UP (ref 10–12.9)
RBC # BLD: 4.54 M/UL — SIGNIFICANT CHANGE UP (ref 4.2–5.8)
RBC # FLD: 13.1 % — SIGNIFICANT CHANGE UP (ref 10.3–14.5)
SODIUM SERPL-SCNC: 142 MMOL/L — SIGNIFICANT CHANGE UP (ref 135–145)
TOTAL CHOLESTEROL/HDL RATIO MEASUREMENT: 2.6 RATIO — LOW (ref 3.4–9.6)
TRIGL SERPL-MCNC: 77 MG/DL — SIGNIFICANT CHANGE UP (ref 10–149)
WBC # BLD: 6.66 K/UL — SIGNIFICANT CHANGE UP (ref 3.8–10.5)
WBC # FLD AUTO: 6.66 K/UL — SIGNIFICANT CHANGE UP (ref 3.8–10.5)

## 2019-06-14 PROCEDURE — 85025 COMPLETE CBC W/AUTO DIFF WBC: CPT

## 2019-06-14 PROCEDURE — C1769: CPT

## 2019-06-14 PROCEDURE — 85730 THROMBOPLASTIN TIME PARTIAL: CPT

## 2019-06-14 PROCEDURE — 83036 HEMOGLOBIN GLYCOSYLATED A1C: CPT

## 2019-06-14 PROCEDURE — 82550 ASSAY OF CK (CPK): CPT

## 2019-06-14 PROCEDURE — 80061 LIPID PANEL: CPT

## 2019-06-14 PROCEDURE — C1894: CPT

## 2019-06-14 PROCEDURE — 93010 ELECTROCARDIOGRAM REPORT: CPT

## 2019-06-14 PROCEDURE — 85610 PROTHROMBIN TIME: CPT

## 2019-06-14 PROCEDURE — C1887: CPT

## 2019-06-14 PROCEDURE — 80053 COMPREHEN METABOLIC PANEL: CPT

## 2019-06-14 PROCEDURE — 82553 CREATINE MB FRACTION: CPT

## 2019-06-14 PROCEDURE — 93458 L HRT ARTERY/VENTRICLE ANGIO: CPT | Mod: 26

## 2019-06-14 PROCEDURE — 93458 L HRT ARTERY/VENTRICLE ANGIO: CPT

## 2019-06-14 PROCEDURE — 93005 ELECTROCARDIOGRAM TRACING: CPT

## 2019-06-14 PROCEDURE — 86140 C-REACTIVE PROTEIN: CPT

## 2019-06-14 RX ORDER — OLMESARTAN MEDOXOMIL 5 MG/1
1 TABLET, FILM COATED ORAL
Qty: 0 | Refills: 0 | DISCHARGE

## 2019-06-14 RX ORDER — CHLORTHALIDONE 50 MG
1 TABLET ORAL
Qty: 0 | Refills: 0 | DISCHARGE

## 2019-06-14 RX ORDER — ROSUVASTATIN CALCIUM 5 MG/1
1 TABLET ORAL
Qty: 0 | Refills: 0 | DISCHARGE

## 2019-06-14 RX ORDER — RANOLAZINE 500 MG/1
1 TABLET, FILM COATED, EXTENDED RELEASE ORAL
Qty: 60 | Refills: 2
Start: 2019-06-14 | End: 2019-09-11

## 2019-06-14 RX ORDER — POTASSIUM CHLORIDE 20 MEQ
40 PACKET (EA) ORAL ONCE
Refills: 0 | Status: COMPLETED | OUTPATIENT
Start: 2019-06-14 | End: 2019-06-14

## 2019-06-14 RX ORDER — DEXLANSOPRAZOLE 30 MG/1
1 CAPSULE, DELAYED RELEASE ORAL
Qty: 0 | Refills: 0 | DISCHARGE

## 2019-06-14 RX ORDER — METOPROLOL TARTRATE 50 MG
1 TABLET ORAL
Qty: 0 | Refills: 0 | DISCHARGE

## 2019-06-14 RX ORDER — SODIUM CHLORIDE 9 MG/ML
500 INJECTION INTRAMUSCULAR; INTRAVENOUS; SUBCUTANEOUS
Refills: 0 | Status: DISCONTINUED | OUTPATIENT
Start: 2019-06-14 | End: 2019-06-14

## 2019-06-14 RX ORDER — NEBIVOLOL HYDROCHLORIDE 5 MG/1
1 TABLET ORAL
Qty: 0 | Refills: 0 | DISCHARGE

## 2019-06-14 RX ORDER — SODIUM CHLORIDE 9 MG/ML
500 INJECTION INTRAMUSCULAR; INTRAVENOUS; SUBCUTANEOUS
Refills: 0 | Status: DISCONTINUED | OUTPATIENT
Start: 2019-06-14 | End: 2019-06-29

## 2019-06-14 RX ADMIN — Medication 40 MILLIEQUIVALENT(S): at 10:01

## 2019-06-14 NOTE — PROGRESS NOTE ADULT - SUBJECTIVE AND OBJECTIVE BOX
Interventional Cardiology PA SDA Discharge Note    Patient without complaints. 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 At time he noted daily exertional chest discomfort 2-3/10 in severity, non radiating and always that self limited and resolves with rest. Pt subsequently underwent catheterization on 05/13/2019 received ISR PTCA/HOLLY mLAD. Per Dr. Rankin (verbal report, need to obtain official report from office [phone], late add on) patient came to office this afternoon complaining of 3/10 chest pain, and underwent stress test which revealed 3mm downsloping ST depressions in leads II, III, aVF and V4-V6. Patient states since procedure he continues to note intermittent episodes of chest pressure 3/10 severity during his bike rides, which usually start at beginning of bike rides but subside by the end of rides. Although he is experiencing these symptoms he states symptoms are much improved from CP experienced prior to cath on 05/2019. He denies SOB, palpitations, syncope, PND/orthopnea or LE edema. Patient endorses compliance with ASA and Brilinta. ECHO 2/1/19:  mild LVH, normal wall motion, EF 62%, no significant valvular disease, no pericardial effusion.                1.	Stable for discharge as per attending  _________ after bed rest, pt voids, groin/wrist stable and 30 minutes of ambulation.  2.	Follow-up with PMD/Cardiologist ___________ in 1-2 weeks  3.	Discharged forms signed and copies in chart Interventional Cardiology PA SDA Discharge Note    Patient without complaints. 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 At time he noted daily exertional chest discomfort 2-3/10 in severity, non radiating and always that self limited and resolves with rest. Pt subsequently underwent catheterization on 05/13/2019 received ISR PTCA/HOLLY mLAD. Per Dr. Rankin (verbal report, need to obtain official report from office [phone], late add on) patient came to office this afternoon complaining of 3/10 chest pain, and underwent stress test which revealed 3mm downsloping ST depressions in leads II, III, aVF and V4-V6. Patient states since procedure he continues to note intermittent episodes of chest pressure 3/10 severity during his bike rides, which usually start at beginning of bike rides but subside by the end of rides. Although he is experiencing these symptoms he states symptoms are much improved from CP experienced prior to cath on 05/2019. He denies SOB, palpitations, syncope, PND/orthopnea or LE edema. Patient endorses compliance with ASA and Brilinta. ECHO 2/1/19:  mild LVH, normal wall motion, EF 62%, no significant valvular disease, no pericardial effusion. Patient was referred to [facility] for recommended cardiac catheterization with possible intervention in the setting of risk factors, class II anginal symptoms and abnormal stress test. He underwent cardiac cath on 6/14/19 revealing LM normal, prox/midLAD stents patent,                 1.	Stable for discharge as per attending . _________ after bed rest, pt voids, groin/wrist stable and 30 minutes of ambulation.  2.	Follow-up with PMD/Cardiologist ___________ in 1-2 weeks  3.	Discharged forms signed and copies in chart Interventional Cardiology PA SDA Discharge Note    Patient without complaints. 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 At time he noted daily exertional chest discomfort 2-3/10 in severity, non radiating and always that self limited and resolves with rest. Pt subsequently underwent catheterization on 05/13/2019 received ISR PTCA/HOLLY mLAD. Per Dr. Rankin (verbal report, need to obtain official report from office [phone], late add on) patient came to office this afternoon complaining of 3/10 chest pain, and underwent stress test which revealed 3mm downsloping ST depressions in leads II, III, aVF and V4-V6. Patient states since procedure he continues to note intermittent episodes of chest pressure 3/10 severity during his bike rides, which usually start at beginning of bike rides but subside by the end of rides. Although he is experiencing these symptoms he states symptoms are much improved from CP experienced prior to cath on 05/2019. He denies SOB, palpitations, syncope, PND/orthopnea or LE edema. Patient endorses compliance with ASA and Brilinta. ECHO 2/1/19:  mild LVH, normal wall motion, EF 62%, no significant valvular disease, no pericardial effusion. Patient was referred to [facility] for recommended cardiac catheterization with possible intervention in the setting of risk factors, class II anginal symptoms and abnormal stress test. He underwent cardiac cath on 6/14/19 revealing LM normal, prox/midLAD stents patent, proxLCx stent patent, distalRCA stent patent, EDP 13, EF 60%, right radial access.                 1.	Stable for discharge as per attending Dr. Conner after bed rest, pt voids, groin/wrist stable and 30 minutes of ambulation.  2.	Follow-up with PMD/Cardiologist Dr. Tellez in 1-2 weeks  3.	Discharged forms signed and copies in chart Interventional Cardiology PA SDA Discharge Note    Patient without complaints. 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 At time he noted daily exertional chest discomfort 2-3/10 in severity, non radiating and always that self limited and resolves with rest. Pt subsequently underwent catheterization on 05/13/2019 received ISR PTCA/HOLLY mLAD. Per Dr. Rankin (verbal report, need to obtain official report from office [phone], late add on) patient came to office this afternoon complaining of 3/10 chest pain, and underwent stress test which revealed 3mm downsloping ST depressions in leads II, III, aVF and V4-V6. Patient states since procedure he continues to note intermittent episodes of chest pressure 3/10 severity during his bike rides, which usually start at beginning of bike rides but subside by the end of rides. Although he is experiencing these symptoms he states symptoms are much improved from CP experienced prior to cath on 05/2019. He denies SOB, palpitations, syncope, PND/orthopnea or LE edema. Patient endorses compliance with ASA and Brilinta. ECHO 2/1/19:  mild LVH, normal wall motion, EF 62%, no significant valvular disease, no pericardial effusion. Patient was referred to [facility] for recommended cardiac catheterization with possible intervention in the setting of risk factors, class II anginal symptoms and abnormal stress test. He underwent cardiac cath on 6/14/19 revealing LM normal, prox/midLAD stents patent, proxLCx stent patent, distalRCA stent patent, EDP 13, EF 60%, right radial access. Ranexa 500mg BID e-prescribed to patient's pharmacy per Dr. Conner.                1.	Stable for discharge as per attending Dr. Conner after bed rest, pt voids, groin/wrist stable and 30 minutes of ambulation.  2.	Follow-up with PMD/Cardiologist Dr. Tellez in 1-2 weeks  3.	Discharged forms signed and copies in chart

## 2019-06-20 DIAGNOSIS — K21.9 GASTRO-ESOPHAGEAL REFLUX DISEASE WITHOUT ESOPHAGITIS: ICD-10-CM

## 2019-06-20 DIAGNOSIS — Z95.5 PRESENCE OF CORONARY ANGIOPLASTY IMPLANT AND GRAFT: ICD-10-CM

## 2019-06-20 DIAGNOSIS — R94.39 ABNORMAL RESULT OF OTHER CARDIOVASCULAR FUNCTION STUDY: ICD-10-CM

## 2019-06-20 DIAGNOSIS — E78.5 HYPERLIPIDEMIA, UNSPECIFIED: ICD-10-CM

## 2019-06-20 DIAGNOSIS — Z85.46 PERSONAL HISTORY OF MALIGNANT NEOPLASM OF PROSTATE: ICD-10-CM

## 2019-06-20 DIAGNOSIS — I10 ESSENTIAL (PRIMARY) HYPERTENSION: ICD-10-CM

## 2019-06-20 DIAGNOSIS — F17.200 NICOTINE DEPENDENCE, UNSPECIFIED, UNCOMPLICATED: ICD-10-CM

## 2019-06-20 DIAGNOSIS — Z82.49 FAMILY HISTORY OF ISCHEMIC HEART DISEASE AND OTHER DISEASES OF THE CIRCULATORY SYSTEM: ICD-10-CM

## 2020-10-01 PROBLEM — M54.10 BILATERAL RADIATING LEG PAIN: Status: ACTIVE | Noted: 2019-03-28

## 2021-10-06 PROBLEM — I10 ESSENTIAL HYPERTENSION: Status: ACTIVE | Noted: 2018-12-07

## 2022-08-12 NOTE — ED ADULT NURSE NOTE - NS ED NURSE LEVEL OF CONSCIOUSNESS SPEECH
Speaking Coherently [NS_DeliveryAttending1_OBGYN_ALL_OB_FT:[id number]],[NS_DeliveryAssist1_OBGYN_ALL_OB_FT:[id number]],[NS_DeliveryRN_OBGYN_ALL_OB_FT:[id number]],[NS_CirculateRN2_OBGYN_ALL_OB_FT:[id number]] [NS_DeliveryAttending1_OBGYN_ALL_OB_FT:[id number]],[NS_DeliveryAssist1_OBGYN_ALL_OB_FT:[id number]],[NS_DeliveryRN_OBGYN_ALL_OB_FT:[id number]],[NS_CirculateRN2_OBGYN_ALL_OB_FT:[id number]]

## 2024-12-17 ENCOUNTER — APPOINTMENT (OUTPATIENT)
Dept: ORTHOPEDIC SURGERY | Facility: CLINIC | Age: 74
End: 2024-12-17
Payer: MEDICARE

## 2024-12-17 VITALS — WEIGHT: 165 LBS | HEIGHT: 68 IN | BODY MASS INDEX: 25.01 KG/M2

## 2024-12-17 DIAGNOSIS — M23.92 UNSPECIFIED INTERNAL DERANGEMENT OF LEFT KNEE: ICD-10-CM

## 2024-12-17 PROCEDURE — 99204 OFFICE O/P NEW MOD 45 MIN: CPT | Mod: 25

## 2024-12-17 PROCEDURE — 73564 X-RAY EXAM KNEE 4 OR MORE: CPT | Mod: LT

## 2024-12-17 PROCEDURE — 20610 DRAIN/INJ JOINT/BURSA W/O US: CPT | Mod: LT
